# Patient Record
Sex: FEMALE | Race: ASIAN | NOT HISPANIC OR LATINO | URBAN - METROPOLITAN AREA
[De-identification: names, ages, dates, MRNs, and addresses within clinical notes are randomized per-mention and may not be internally consistent; named-entity substitution may affect disease eponyms.]

---

## 2018-08-27 ENCOUNTER — INPATIENT (INPATIENT)
Facility: HOSPITAL | Age: 53
LOS: 1 days | Discharge: HOME | DRG: 287 | End: 2018-08-29
Attending: INTERNAL MEDICINE | Admitting: HOSPITALIST
Payer: SELF-PAY

## 2018-08-27 VITALS
OXYGEN SATURATION: 99 % | HEIGHT: 59 IN | DIASTOLIC BLOOD PRESSURE: 73 MMHG | HEART RATE: 80 BPM | SYSTOLIC BLOOD PRESSURE: 149 MMHG | TEMPERATURE: 98 F | RESPIRATION RATE: 18 BRPM | WEIGHT: 104.94 LBS

## 2018-08-27 PROCEDURE — 99285 EMERGENCY DEPT VISIT HI MDM: CPT | Mod: 25

## 2018-08-27 PROCEDURE — 99053 MED SERV 10PM-8AM 24 HR FAC: CPT

## 2018-08-27 PROCEDURE — 93010 ELECTROCARDIOGRAM REPORT: CPT

## 2018-08-28 ENCOUNTER — TRANSCRIPTION ENCOUNTER (OUTPATIENT)
Age: 53
End: 2018-08-28

## 2018-08-28 DIAGNOSIS — F17.200 NICOTINE DEPENDENCE, UNSPECIFIED, UNCOMPLICATED: ICD-10-CM

## 2018-08-28 DIAGNOSIS — E03.9 HYPOTHYROIDISM, UNSPECIFIED: ICD-10-CM

## 2018-08-28 DIAGNOSIS — I24.9 ACUTE ISCHEMIC HEART DISEASE, UNSPECIFIED: ICD-10-CM

## 2018-08-28 DIAGNOSIS — Z29.9 ENCOUNTER FOR PROPHYLACTIC MEASURES, UNSPECIFIED: ICD-10-CM

## 2018-08-28 DIAGNOSIS — R07.9 CHEST PAIN, UNSPECIFIED: ICD-10-CM

## 2018-08-28 LAB
ALBUMIN SERPL ELPH-MCNC: 3.9 G/DL — SIGNIFICANT CHANGE UP (ref 3.3–5.2)
ALP SERPL-CCNC: 38 U/L — LOW (ref 40–120)
ALT FLD-CCNC: 12 U/L — SIGNIFICANT CHANGE UP
AMPHET UR-MCNC: NEGATIVE — SIGNIFICANT CHANGE UP
ANION GAP SERPL CALC-SCNC: 11 MMOL/L — SIGNIFICANT CHANGE UP (ref 5–17)
ANION GAP SERPL CALC-SCNC: 11 MMOL/L — SIGNIFICANT CHANGE UP (ref 5–17)
APPEARANCE UR: CLEAR — SIGNIFICANT CHANGE UP
APTT BLD: 34.7 SEC — SIGNIFICANT CHANGE UP (ref 27.5–37.4)
AST SERPL-CCNC: 14 U/L — SIGNIFICANT CHANGE UP
BARBITURATES UR SCN-MCNC: NEGATIVE — SIGNIFICANT CHANGE UP
BASOPHILS # BLD AUTO: 0 K/UL — SIGNIFICANT CHANGE UP (ref 0–0.2)
BASOPHILS NFR BLD AUTO: 0.6 % — SIGNIFICANT CHANGE UP (ref 0–2)
BENZODIAZ UR-MCNC: NEGATIVE — SIGNIFICANT CHANGE UP
BILIRUB SERPL-MCNC: 0.2 MG/DL — LOW (ref 0.4–2)
BILIRUB UR-MCNC: NEGATIVE — SIGNIFICANT CHANGE UP
BUN SERPL-MCNC: 20 MG/DL — SIGNIFICANT CHANGE UP (ref 8–20)
BUN SERPL-MCNC: 26 MG/DL — HIGH (ref 8–20)
CALCIUM SERPL-MCNC: 8.9 MG/DL — SIGNIFICANT CHANGE UP (ref 8.6–10.2)
CALCIUM SERPL-MCNC: 9 MG/DL — SIGNIFICANT CHANGE UP (ref 8.6–10.2)
CHLORIDE SERPL-SCNC: 103 MMOL/L — SIGNIFICANT CHANGE UP (ref 98–107)
CHLORIDE SERPL-SCNC: 106 MMOL/L — SIGNIFICANT CHANGE UP (ref 98–107)
CHOLEST SERPL-MCNC: 203 MG/DL — HIGH (ref 110–199)
CK SERPL-CCNC: 92 U/L — SIGNIFICANT CHANGE UP (ref 25–170)
CO2 SERPL-SCNC: 24 MMOL/L — SIGNIFICANT CHANGE UP (ref 22–29)
CO2 SERPL-SCNC: 26 MMOL/L — SIGNIFICANT CHANGE UP (ref 22–29)
COCAINE METAB.OTHER UR-MCNC: NEGATIVE — SIGNIFICANT CHANGE UP
COLOR SPEC: YELLOW — SIGNIFICANT CHANGE UP
CREAT SERPL-MCNC: 0.84 MG/DL — SIGNIFICANT CHANGE UP (ref 0.5–1.3)
CREAT SERPL-MCNC: 0.94 MG/DL — SIGNIFICANT CHANGE UP (ref 0.5–1.3)
D DIMER BLD IA.RAPID-MCNC: <150 NG/ML DDU — SIGNIFICANT CHANGE UP
DIFF PNL FLD: NEGATIVE — SIGNIFICANT CHANGE UP
EOSINOPHIL # BLD AUTO: 0.2 K/UL — SIGNIFICANT CHANGE UP (ref 0–0.5)
EOSINOPHIL NFR BLD AUTO: 2.3 % — SIGNIFICANT CHANGE UP (ref 0–6)
EPI CELLS # UR: SIGNIFICANT CHANGE UP
GLUCOSE SERPL-MCNC: 102 MG/DL — SIGNIFICANT CHANGE UP (ref 70–115)
GLUCOSE SERPL-MCNC: 119 MG/DL — HIGH (ref 70–115)
GLUCOSE UR QL: NEGATIVE MG/DL — SIGNIFICANT CHANGE UP
HBA1C BLD-MCNC: 5.5 % — SIGNIFICANT CHANGE UP (ref 4–5.6)
HCT VFR BLD CALC: 45.4 % — SIGNIFICANT CHANGE UP (ref 37–47)
HCT VFR BLD CALC: 45.6 % — SIGNIFICANT CHANGE UP (ref 37–47)
HDLC SERPL-MCNC: 67 MG/DL — SIGNIFICANT CHANGE UP
HGB BLD-MCNC: 15 G/DL — SIGNIFICANT CHANGE UP (ref 12–16)
HGB BLD-MCNC: 15.2 G/DL — SIGNIFICANT CHANGE UP (ref 12–16)
INR BLD: 0.86 RATIO — LOW (ref 0.88–1.16)
KETONES UR-MCNC: ABNORMAL
LEUKOCYTE ESTERASE UR-ACNC: ABNORMAL
LIPID PNL WITH DIRECT LDL SERPL: 124 MG/DL — SIGNIFICANT CHANGE UP
LYMPHOCYTES # BLD AUTO: 2.3 K/UL — SIGNIFICANT CHANGE UP (ref 1–4.8)
LYMPHOCYTES # BLD AUTO: 35.7 % — SIGNIFICANT CHANGE UP (ref 20–55)
MAGNESIUM SERPL-MCNC: 2.2 MG/DL — SIGNIFICANT CHANGE UP (ref 1.6–2.6)
MCHC RBC-ENTMCNC: 30.5 PG — SIGNIFICANT CHANGE UP (ref 27–31)
MCHC RBC-ENTMCNC: 31.1 PG — HIGH (ref 27–31)
MCHC RBC-ENTMCNC: 32.9 G/DL — SIGNIFICANT CHANGE UP (ref 32–36)
MCHC RBC-ENTMCNC: 33.5 G/DL — SIGNIFICANT CHANGE UP (ref 32–36)
MCV RBC AUTO: 92.7 FL — SIGNIFICANT CHANGE UP (ref 81–99)
MCV RBC AUTO: 92.8 FL — SIGNIFICANT CHANGE UP (ref 81–99)
METHADONE UR-MCNC: NEGATIVE — SIGNIFICANT CHANGE UP
MONOCYTES # BLD AUTO: 0.5 K/UL — SIGNIFICANT CHANGE UP (ref 0–0.8)
MONOCYTES NFR BLD AUTO: 7.7 % — SIGNIFICANT CHANGE UP (ref 3–10)
NEUTROPHILS # BLD AUTO: 3.5 K/UL — SIGNIFICANT CHANGE UP (ref 1.8–8)
NEUTROPHILS NFR BLD AUTO: 53.4 % — SIGNIFICANT CHANGE UP (ref 37–73)
NITRITE UR-MCNC: NEGATIVE — SIGNIFICANT CHANGE UP
OPIATES UR-MCNC: NEGATIVE — SIGNIFICANT CHANGE UP
PCP SPEC-MCNC: SIGNIFICANT CHANGE UP
PCP UR-MCNC: NEGATIVE — SIGNIFICANT CHANGE UP
PH UR: 6 — SIGNIFICANT CHANGE UP (ref 5–8)
PHOSPHATE SERPL-MCNC: 4.1 MG/DL — SIGNIFICANT CHANGE UP (ref 2.4–4.7)
PLATELET # BLD AUTO: 167 K/UL — SIGNIFICANT CHANGE UP (ref 150–400)
PLATELET # BLD AUTO: 170 K/UL — SIGNIFICANT CHANGE UP (ref 150–400)
POTASSIUM SERPL-MCNC: 4.2 MMOL/L — SIGNIFICANT CHANGE UP (ref 3.5–5.3)
POTASSIUM SERPL-MCNC: 4.4 MMOL/L — SIGNIFICANT CHANGE UP (ref 3.5–5.3)
POTASSIUM SERPL-SCNC: 4.2 MMOL/L — SIGNIFICANT CHANGE UP (ref 3.5–5.3)
POTASSIUM SERPL-SCNC: 4.4 MMOL/L — SIGNIFICANT CHANGE UP (ref 3.5–5.3)
PROT SERPL-MCNC: 6.7 G/DL — SIGNIFICANT CHANGE UP (ref 6.6–8.7)
PROT UR-MCNC: 15 MG/DL
PROTHROM AB SERPL-ACNC: 9.4 SEC — LOW (ref 9.8–12.7)
RBC # BLD: 4.89 M/UL — SIGNIFICANT CHANGE UP (ref 4.4–5.2)
RBC # BLD: 4.92 M/UL — SIGNIFICANT CHANGE UP (ref 4.4–5.2)
RBC # FLD: 12.4 % — SIGNIFICANT CHANGE UP (ref 11–15.6)
RBC # FLD: 12.5 % — SIGNIFICANT CHANGE UP (ref 11–15.6)
RBC CASTS # UR COMP ASSIST: NEGATIVE /HPF — SIGNIFICANT CHANGE UP (ref 0–4)
SODIUM SERPL-SCNC: 140 MMOL/L — SIGNIFICANT CHANGE UP (ref 135–145)
SODIUM SERPL-SCNC: 141 MMOL/L — SIGNIFICANT CHANGE UP (ref 135–145)
SP GR SPEC: 1.02 — SIGNIFICANT CHANGE UP (ref 1.01–1.02)
T3 SERPL-MCNC: 129 NG/DL — SIGNIFICANT CHANGE UP (ref 80–200)
T4 AB SER-ACNC: 9.5 UG/DL — SIGNIFICANT CHANGE UP (ref 4.5–12)
THC UR QL: NEGATIVE — SIGNIFICANT CHANGE UP
TOTAL CHOLESTEROL/HDL RATIO MEASUREMENT: 3 RATIO — LOW (ref 3.3–7.1)
TRIGL SERPL-MCNC: 58 MG/DL — SIGNIFICANT CHANGE UP (ref 10–200)
TROPONIN T SERPL-MCNC: <0.01 NG/ML — SIGNIFICANT CHANGE UP (ref 0–0.06)
TSH SERPL-MCNC: <0.1 UIU/ML — LOW (ref 0.27–4.2)
UROBILINOGEN FLD QL: 1 MG/DL
WBC # BLD: 6.5 K/UL — SIGNIFICANT CHANGE UP (ref 4.8–10.8)
WBC # BLD: 6.7 K/UL — SIGNIFICANT CHANGE UP (ref 4.8–10.8)
WBC # FLD AUTO: 6.5 K/UL — SIGNIFICANT CHANGE UP (ref 4.8–10.8)
WBC # FLD AUTO: 6.7 K/UL — SIGNIFICANT CHANGE UP (ref 4.8–10.8)
WBC UR QL: SIGNIFICANT CHANGE UP

## 2018-08-28 PROCEDURE — 99223 1ST HOSP IP/OBS HIGH 75: CPT

## 2018-08-28 PROCEDURE — 99497 ADVNCD CARE PLAN 30 MIN: CPT | Mod: 25

## 2018-08-28 PROCEDURE — 93351 STRESS TTE COMPLETE: CPT | Mod: 26

## 2018-08-28 PROCEDURE — 93010 ELECTROCARDIOGRAM REPORT: CPT

## 2018-08-28 PROCEDURE — 71275 CT ANGIOGRAPHY CHEST: CPT | Mod: 26

## 2018-08-28 PROCEDURE — 99255 IP/OBS CONSLTJ NEW/EST HI 80: CPT

## 2018-08-28 PROCEDURE — 12345: CPT | Mod: NC

## 2018-08-28 PROCEDURE — 71045 X-RAY EXAM CHEST 1 VIEW: CPT | Mod: 26

## 2018-08-28 RX ORDER — LOSARTAN POTASSIUM 100 MG/1
1 TABLET, FILM COATED ORAL
Qty: 30 | Refills: 0 | OUTPATIENT
Start: 2018-08-28 | End: 2018-09-26

## 2018-08-28 RX ORDER — ONDANSETRON 8 MG/1
4 TABLET, FILM COATED ORAL ONCE
Qty: 0 | Refills: 0 | Status: COMPLETED | OUTPATIENT
Start: 2018-08-28 | End: 2018-08-28

## 2018-08-28 RX ORDER — ATORVASTATIN CALCIUM 80 MG/1
20 TABLET, FILM COATED ORAL AT BEDTIME
Qty: 0 | Refills: 0 | Status: DISCONTINUED | OUTPATIENT
Start: 2018-08-28 | End: 2018-08-29

## 2018-08-28 RX ORDER — ATORVASTATIN CALCIUM 80 MG/1
1 TABLET, FILM COATED ORAL
Qty: 30 | Refills: 0 | OUTPATIENT
Start: 2018-08-28 | End: 2018-09-26

## 2018-08-28 RX ORDER — OXYCODONE AND ACETAMINOPHEN 5; 325 MG/1; MG/1
1 TABLET ORAL EVERY 6 HOURS
Qty: 0 | Refills: 0 | Status: DISCONTINUED | OUTPATIENT
Start: 2018-08-28 | End: 2018-08-29

## 2018-08-28 RX ORDER — ASPIRIN/CALCIUM CARB/MAGNESIUM 324 MG
325 TABLET ORAL ONCE
Qty: 0 | Refills: 0 | Status: COMPLETED | OUTPATIENT
Start: 2018-08-28 | End: 2018-08-28

## 2018-08-28 RX ORDER — LEVOTHYROXINE SODIUM 125 MCG
75 TABLET ORAL DAILY
Qty: 0 | Refills: 0 | Status: DISCONTINUED | OUTPATIENT
Start: 2018-08-28 | End: 2018-08-29

## 2018-08-28 RX ORDER — NITROGLYCERIN 6.5 MG
0.4 CAPSULE, EXTENDED RELEASE ORAL ONCE
Qty: 0 | Refills: 0 | Status: COMPLETED | OUTPATIENT
Start: 2018-08-28 | End: 2018-08-28

## 2018-08-28 RX ORDER — HEPARIN SODIUM 5000 [USP'U]/ML
5000 INJECTION INTRAVENOUS; SUBCUTANEOUS EVERY 8 HOURS
Qty: 0 | Refills: 0 | Status: DISCONTINUED | OUTPATIENT
Start: 2018-08-28 | End: 2018-08-29

## 2018-08-28 RX ORDER — KETOROLAC TROMETHAMINE 30 MG/ML
15 SYRINGE (ML) INJECTION ONCE
Qty: 0 | Refills: 0 | Status: DISCONTINUED | OUTPATIENT
Start: 2018-08-28 | End: 2018-08-28

## 2018-08-28 RX ORDER — LOSARTAN POTASSIUM 100 MG/1
25 TABLET, FILM COATED ORAL DAILY
Qty: 0 | Refills: 0 | Status: DISCONTINUED | OUTPATIENT
Start: 2018-08-28 | End: 2018-08-29

## 2018-08-28 RX ORDER — ONDANSETRON 8 MG/1
4 TABLET, FILM COATED ORAL EVERY 6 HOURS
Qty: 0 | Refills: 0 | Status: DISCONTINUED | OUTPATIENT
Start: 2018-08-28 | End: 2018-08-29

## 2018-08-28 RX ORDER — SODIUM CHLORIDE 9 MG/ML
1000 INJECTION INTRAMUSCULAR; INTRAVENOUS; SUBCUTANEOUS ONCE
Qty: 0 | Refills: 0 | Status: DISCONTINUED | OUTPATIENT
Start: 2018-08-28 | End: 2018-08-28

## 2018-08-28 RX ORDER — NITROGLYCERIN 6.5 MG
0.4 CAPSULE, EXTENDED RELEASE ORAL
Qty: 0 | Refills: 0 | Status: DISCONTINUED | OUTPATIENT
Start: 2018-08-28 | End: 2018-08-29

## 2018-08-28 RX ORDER — LISINOPRIL 2.5 MG/1
5 TABLET ORAL DAILY
Qty: 0 | Refills: 0 | Status: DISCONTINUED | OUTPATIENT
Start: 2018-08-28 | End: 2018-08-28

## 2018-08-28 RX ORDER — METOPROLOL TARTRATE 50 MG
12.5 TABLET ORAL EVERY 12 HOURS
Qty: 0 | Refills: 0 | Status: DISCONTINUED | OUTPATIENT
Start: 2018-08-28 | End: 2018-08-28

## 2018-08-28 RX ADMIN — HEPARIN SODIUM 5000 UNIT(S): 5000 INJECTION INTRAVENOUS; SUBCUTANEOUS at 10:55

## 2018-08-28 RX ADMIN — Medication 325 MILLIGRAM(S): at 03:18

## 2018-08-28 RX ADMIN — HEPARIN SODIUM 5000 UNIT(S): 5000 INJECTION INTRAVENOUS; SUBCUTANEOUS at 22:31

## 2018-08-28 RX ADMIN — ATORVASTATIN CALCIUM 20 MILLIGRAM(S): 80 TABLET, FILM COATED ORAL at 22:30

## 2018-08-28 NOTE — DISCHARGE NOTE ADULT - PATIENT PORTAL LINK FT
You can access the [facility] Patient Portal, offered by [facility], by registering with the following website: [email or web address]

## 2018-08-28 NOTE — ED PROVIDER NOTE - ATTENDING CONTRIBUTION TO CARE
I personally saw the patient with the PA, and completed the key components of the history and physical exam. I then discussed the management plan with the PA.   gen in nad resp clear cardiac no murmur abd soft neuro intact ischemic ekg will admit for acs

## 2018-08-28 NOTE — H&P ADULT - ASSESSMENT
52 yo female who is a current everyday smoker for past 25 years who presents with atypical chest pain concerning for ACS
other/has foot drop and walking instability

## 2018-08-28 NOTE — PROGRESS NOTE ADULT - SUBJECTIVE AND OBJECTIVE BOX
HEALTH ISSUES - PROBLEM Dx:  54 yo F with a pmh/o hypothyroidism, who is a current half pack per day smoker for over 25 yrs, who presents to ED complaining of left hand pain which began acutely today and which she thought was tendonitis. Pt denies any chest pain now, however partner and pt states she has intermittent chest pain in past and also intermittent sob. Pt also has been having epigastric pain and nausea intermittently. Today pt came to ED for L arm tingling and "heaviness." sent from urgent care due to abnormal EKG. Pt states she has had these sx in her extremities before, sometimes so severe she cannot turn her head and cannot seem to catch her breath. Pt has not been evaluated by cardiology ever and rarely sees PMD as per partner at bedside. Pt now c/o nausea at bedside. Denies diaphoresis, V/D, headache, syncope, numbness, weakness, and any other acute symptoms at this time. Unknown family history.    INTERVAL HPI/ OVERNIGHT EVENTS:  repeated c/o CP. with spontaneous resolution.  when asked she says she has CP. and later she doesnt talk about it.      Vital Signs Last 24 Hrs  T(C): 36.7 (28 Aug 2018 10:50), Max: 36.8 (27 Aug 2018 23:03)  T(F): 98.1 (28 Aug 2018 10:50), Max: 98.3 (27 Aug 2018 23:03)  HR: 70 (28 Aug 2018 10:50) (70 - 83)  BP: 111/52 (28 Aug 2018 10:50) (110/63 - 149/73)  BP(mean): --  RR: 18 (28 Aug 2018 10:50) (16 - 18)  SpO2: 97% (28 Aug 2018 10:50) (97% - 99%)    PHYSICAL EXAM-  GENERAL: NAD, well-groomed, well-developed  HEAD:  Atraumatic, Normocephalic  EYES: conjunctiva and sclera clear  ENMT: Moist mucous membranes, No lesions  NECK: Supple, No JVD, Normal thyroid  NERVOUS SYSTEM:  Alert & Oriented X 3, Motor Strength 5/5 B/L upper and lower extremities  CHEST/LUNG: Clear to auscultate bilaterally; No rales, rhonchi, wheezing, or rubs  HEART: Regular rate and rhythm; No murmurs, rubs, or gallops  ABDOMEN: Soft, Nontender, Nondistended; Bowel sounds present  EXTREMITIES:  2+ Peripheral Pulses, No clubbing, cyanosis, or edema  LYMPH: No lymphadenopathy noted  SKIN: No rashes or lesions    LABS:                        15.0   6.7   )-----------( 170      ( 28 Aug 2018 06:32 )             45.6         141  |  106  |  26.0<H>  ----------------------------<  102  4.4   |  24.0  |  0.84    Ca    8.9      28 Aug 2018 06:32  Phos  4.1       Mg     2.2         TPro  6.7  /  Alb  3.9  /  TBili  0.2<L>  /  DBili  x   /  AST  14  /  ALT  12  /  AlkPhos  38<L>      PT/INR - ( 28 Aug 2018 04:52 )   PT: 9.4 sec;   INR: 0.86 ratio         PTT - ( 28 Aug 2018 04:52 )  PTT:34.7 sec  Urinalysis Basic - ( 28 Aug 2018 06:14 )    Color: Yellow / Appearance: Clear / S.025 / pH: x  Gluc: x / Ketone: Trace  / Bili: Negative / Urobili: 1 mg/dL   Blood: x / Protein: 15 mg/dL / Nitrite: Negative   Leuk Esterase: Trace / RBC: Negative /HPF / WBC 0-2   Sq Epi: x / Non Sq Epi: Few / Bacteria: x      Assessment and Plan

## 2018-08-28 NOTE — PROGRESS NOTE ADULT - PROBLEM SELECTOR PLAN 1
admit to telemetry  EKG reviewed-lateral and inferior lead T wave inversion noted  EKG prn chest pain  NTG prn chest pain  ASA held as pt reports allergy   BB, ACE, statin started at low dose  serial troponin, first negative  TTE  HbA1c  lipid panel  cardiology consulted- [facility] cardiology

## 2018-08-28 NOTE — DISCHARGE NOTE ADULT - MEDICATION SUMMARY - MEDICATIONS TO TAKE
I will START or STAY ON the medications listed below when I get home from the hospital:    hydroxycut  -- at same dose as before as needed  -- Indication: For Home med    losartan 25 mg oral tablet  -- 1 tab(s) by mouth once a day  -- Indication: For Htn    atorvastatin 20 mg oral tablet  -- 1 tab(s) by mouth once a day (at bedtime)  -- Indication: For Hld    levothyroxine 75 mcg (0.075 mg) oral tablet  -- 1 tab(s) by mouth once a day  -- Indication: For Hypothyroidism I will START or STAY ON the medications listed below when I get home from the hospital:    losartan 50 mg oral tablet  -- 1 tab(s) by mouth once a day  -- Indication: For HTN    atorvastatin 20 mg oral tablet  -- 1 tab(s) by mouth once a day (at bedtime)  -- Indication: For HLD    levothyroxine 75 mcg (0.075 mg) oral tablet  -- 1 tab(s) by mouth once a day  -- Indication: For Hypothyroidism

## 2018-08-28 NOTE — ED PROVIDER NOTE - OBJECTIVE STATEMENT
52 yo F Pt, PmHx: hypothyroidism, presents to ED complaining of chest pain. Pt states she has intermittent chest pain, worse with deep inspiration starting @08:00. PT admits to associated L arm tingling and "heaviness." Pt states she has had similar symptoms in the past but has not received a work up. Pt seen at  and sent to ED for abnormal EKG. Pt denies SOB, diaphoresis, N/V/D, headache, syncope, numbness, weakness, and any other acute symptoms at this time. Unknown family history.

## 2018-08-28 NOTE — H&P ADULT - HISTORY OF PRESENT ILLNESS
52 yo F with a pmh/o hypothyroidism, who is a current half pack per day smoker for over 25 yrs, who presents to ED complaining of left hand pain which began acutely today and which she thought was tendonitis. Pt denies any chest pain now, however partner and pt states she has intermittent chest pain in past and also intermittent sob. Pt also has been having epigastric pain and nausea intermittently. Today pt came to ED for L arm tingling and "heaviness." sent from urgent care due to abnormal EKG. Pt states she has had these sx in her extremeties before, sometimes so severe she cannot turn her head and cannot seem to catch her breath. Pt has not been evaluated by cardiology ever and rarely sees pmd as per partner at bedside. Pt now c/o nausea at bedside. Denies diaphoresis, V/D, headache, syncope, numbness, weakness, and any other acute symptoms at this time. Unknown family history.

## 2018-08-28 NOTE — CONSULT NOTE ADULT - ASSESSMENT
Assessment / Plan:  54 yo F Pt, PmHx: hypothyroidism, presents to ED complaining of chest pain. Pt states she has intermittent chest pain, worse with deep inspiration starting @08:00. PT admits to associated L arm tingling and "heaviness." Pt states she has had similar symptoms in the past but has not received a work up. Pt seen at  and sent to ED for abnormal EKG.    1) Atypical chest pain with abnormal EKG:  r/o ischemic cause                                                                 serial CE and EKG's, TTE                                                                  given CP increases with inspiration, smoking history and wheezing consider CT angio of the chest vs cardiac CT angio    2) Hypothyroid: low TSH would hold synthroid for now conside thyroid ultra sound

## 2018-08-28 NOTE — H&P ADULT - PROBLEM SELECTOR PLAN 1
admit to telemetry  EKG reviewed-lateral and inferior lead T wave inversion noted  EKG prn chest pain  NTG prn chest pain  ASA held as pt reports allergy   BB, ACE, statin started at low dose  serial troponin, first negative  TTE  HbA1c  lipid panel  cardiology consulted- [facility] cardiology

## 2018-08-28 NOTE — DISCHARGE NOTE ADULT - PLAN OF CARE
cardiac w/u negative Follow with PMD/ cardiology Follow with PMD/ cardiology Dr Garcia in 7-10 days  Restricted use with no heavy lifting of affected arm for 48 hours.  No submerging the arm in water for 48 hours.  You may start showering today.  Call your doctor for any bleeding, swelling, loss of sensation in the hand or fingers, or fingers turning blue.  If heavy bleeding or large lumps form, hold pressure at the spot and come to the Emergency Room.

## 2018-08-28 NOTE — ED ADULT NURSE NOTE - NSIMPLEMENTINTERV_GEN_ALL_ED
Implemented All Universal Safety Interventions:  [place] to call system. Call bell, personal items and telephone within reach. Instruct patient to call for assistance. Room bathroom lighting operational. Non-slip footwear when patient is off stretcher. Physically safe environment: no spills, clutter or unnecessary equipment. Stretcher in lowest position, wheels locked, appropriate side rails in place.

## 2018-08-28 NOTE — DISCHARGE NOTE ADULT - CARE PROVIDER_API CALL
Med Garcia), Cardiovascular Disease  [address] [place] [address]  Phone: [phone]  Fax: [phone]    JOHNNY staton  Phone: (   )    -  Fax: (   )    -

## 2018-08-28 NOTE — ED PROVIDER NOTE - CHPI ED SYMPTOMS NEG
no dizziness/no fever/no back pain/no diaphoresis/no chills/no cough/no shortness of breath/no vomiting/no nausea/no syncope

## 2018-08-28 NOTE — ED ADULT NURSE REASSESSMENT NOTE - NS ED NURSE REASSESS COMMENT FT1
Pt is Aox3 in NAD. Pt denies complaint.  IV clean dry and intact,Sinus carmelita on monitor 60s  Safety maintained, Bed locked in lowest position. Call bell in reach. Pt awaiting bed placement.

## 2018-08-28 NOTE — ED ADULT NURSE NOTE - OBJECTIVE STATEMENT
Pt received sitting on stretcher in NAD playing a game on phone. Pt AOx3 C/o of chest pain x few weeks but went to urgentcare and was told to come in to ED. Pt states the paint goes from EPI gastric to middle of chest. Neuro WNL. PERRLA. Lungs CTA, RR even unlabored. Ab soft non tender, + bowel sounds x 4quads. Denies Nausea, Vomiting, Diarrhea. Skin warm, dry, color appropriate for age and race. pt on monitor sinus 70s

## 2018-08-28 NOTE — DISCHARGE NOTE ADULT - NS AS ACTIVITY OBS
Walking-Outdoors allowed/Stairs allowed/Walking-Indoors allowed/Bathing allowed/No Heavy lifting/straining/Do not make important decisions/Sex allowed/Showering allowed

## 2018-08-28 NOTE — H&P ADULT - ATTENDING COMMENTS
30 min time spent discussing advanced care planning including code status, existence of health care proxy, plan of treatment, consultants if called, and prognosis. Family and pt in agreement with above, all questions answered and concerns addressed.      patient agrees to npo and bed rest until further evaluated by cardiology  FULL CODE

## 2018-08-28 NOTE — DISCHARGE NOTE ADULT - MEDICATION SUMMARY - MEDICATIONS TO STOP TAKING
I will STOP taking the medications listed below when I get home from the hospital:  None I will STOP taking the medications listed below when I get home from the hospital:    hydroxybhavyat

## 2018-08-28 NOTE — DISCHARGE NOTE ADULT - CARE PLAN
Principal Discharge DX:	Chest pain  Goal:	cardiac w/u negative  Assessment and plan of treatment:	Follow with PMD/ cardiology  Secondary Diagnosis:	Acquired hypothyroidism  Secondary Diagnosis:	Smoker Principal Discharge DX:	Chest pain  Goal:	cardiac w/u negative  Assessment and plan of treatment:	Follow with PMD/ cardiology Dr Garcia in 7-10 days  Restricted use with no heavy lifting of affected arm for 48 hours.  No submerging the arm in water for 48 hours.  You may start showering today.  Call your doctor for any bleeding, swelling, loss of sensation in the hand or fingers, or fingers turning blue.  If heavy bleeding or large lumps form, hold pressure at the spot and come to the Emergency Room.  Secondary Diagnosis:	Acquired hypothyroidism  Secondary Diagnosis:	Smoker

## 2018-08-28 NOTE — DISCHARGE NOTE ADULT - HOSPITAL COURSE
admitted with CP. noted borderline HTN. now controlled. acs ruled out. CTA ruled out dissection/ PE. NST    Medically stable and agreeable with discharge and follow up plan. Patient was advised to return to ED if any symptoms occur or worsen.    time 45 mins Admitted with CP. Noted borderline HTN. Now controlled. acs ruled out. CTA ruled out dissection/ PE. NST infr wall changes. So taken for cath- Non obstructive CAD. ECHO with mod to severe AI. ARB for after load reduction started.    Medically stable and agreeable with discharge and follow up plan. Patient was advised to return to ED if any symptoms occur or worsen.    time 45 mins

## 2018-08-28 NOTE — CONSULT NOTE ADULT - ATTENDING COMMENTS
pt was seen and examined. above reviewed.  Atypical CP on and off for about a year.   EKG with LVH and ST/T changes.   CE x3 negative.  Pt had a TTE which I reviewed. The aortic kriss is trileaflet, there is at least moderate aortic valve regurgitation.    Due to CP and above finding, CTA of aorta was performed, there was no dissection, no PE. No coronary calcium.   She will undergo stress test today. If negative, she can go home.  She will need afterload reduction for HTN and AI.  If discharged, she will follow with me.  She is advised not to smoke as well.

## 2018-08-28 NOTE — PROGRESS NOTE ADULT - ASSESSMENT
54 yo female who is a current everyday smoker for past 25 years who presents with atypical chest pain concerning for ACS     1) CP- trops neg. tele uneventful. echo showed some concern of aortic dissection. CTA done- neg for dissection. going for NST. If neg, d/c home  repeated c/o CP could be musculoskeletal     2) Smoker - Nicotine Patch.  I strongly encouraged the patient to quit smoking.  I outlined the extensive negative impact of smoking on quality of life and the numerous consequences of continued smoking including lung disease, heart attack, stroke, vascular disease, malignancy, increased mortality, etc.  The patient understood these effects and agreed on smoking cessation.    3) Hypothyroid- Cont home meds    Lovenox

## 2018-08-28 NOTE — CONSULT NOTE ADULT - SUBJECTIVE AND OBJECTIVE BOX
Consult requested by:  ANITA Chen    Reason for Consultation: Chest pain abnormal EKG    History obtained by: Patient and medical record     obtained: No    Chief complaint:  Chest pain    HPI: 52 yo F Pt, PmHx: hypothyroidism, presents to ED complaining of chest pain. Pt states she has intermittent chest pain, worse with deep inspiration starting @08:00. PT admits to associated L arm tingling and "heaviness." Pt states she has had similar symptoms in the past but has not received a work up. Pt seen at  and sent to ED for abnormal EKG. Pt denies SOB, diaphoresis, N/V/D, headache, syncope, numbness, weakness, and any other acute symptoms at this time. Unknown family history.  Above reviewed and noted: I saw and examined this patient in the ED with her  an the bed side, she is in NAD. She admits to midsternal sharp to ache 8/10 chest pain that began one day ago and increases with deep breathing, she note nausea with the chest pain is strong. She also admits to left arm numbness. She denies vomiting, lightheadedness, dizziness, weakness, leg pain, FELIZ, recent illness, cough, recent travel.        REVIEW OF SYSTEMS:  CONSTITUTIONAL: No fever, weight loss, or fatigue  EYES: No eye pain, visual disturbances, or discharge  ENMT:  No difficulty hearing, tinnitus, vertigo; No sinus or throat pain  NECK: No pain or stiffness  RESPIRATORY: No cough, wheezing, chills or hemoptysis; No shortness of breath  CARDIOVASCULAR: + chest pain, no palpitations, dizziness, or leg swelling  GASTROINTESTINAL: No abdominal or epigastric pain. + nausea, No vomiting, or hematemesis; No diarrhea or constipation. No melena or hematochezia.  GENITOURINARY: No dysuria, frequency, hematuria, or incontinence  NEUROLOGICAL: No headaches, memory loss, loss of strength, numbness, or tremors  SKIN: No itching, burning, rashes, or lesions   LYMPH NODES: No enlarged glands  ENDOCRINE: No heat or cold intolerance; No hair loss  MUSCULOSKELETAL: No joint pain or swelling; No muscle, back, or extremity pain  PSYCHIATRIC: No depression, anxiety, mood swings, or difficulty sleeping  HEME/LYMPH: No easy bruising, or bleeding gums  ALLERY AND IMMUNOLOGIC: No hives or eczema      MEDICATIONS  (STANDING):  atorvastatin 20 milliGRAM(s) Oral at bedtime  heparin  Injectable 5000 Unit(s) SubCutaneous every 8 hours  levothyroxine 75 MICROGram(s) Oral daily  lisinopril 5 milliGRAM(s) Oral daily  metoprolol tartrate 12.5 milliGRAM(s) Oral every 12 hours  ondansetron Injectable 4 milliGRAM(s) IV Push once    MEDICATIONS  (PRN):  nitroglycerin     SubLingual 0.4 milliGRAM(s) SubLingual every 5 minutes PRN Chest Pain  ondansetron Injectable 4 milliGRAM(s) IV Push every 6 hours PRN Nausea        PAST MEDICAL & SURGICAL HISTORY:  Hypothyroidism  No significant past surgical history      FAMILY HISTORY: unknown due to adoption     SOCIAL HISTORY:  CIGARETTES: current 1/2 PPD 25+ years  ALCOHOL: rare  DRUGS: denied     Vital Signs Last 24 Hrs  T(C): 36.7 (28 Aug 2018 03:11), Max: 36.8 (27 Aug 2018 23:03)  T(F): 98 (28 Aug 2018 03:11), Max: 98.3 (27 Aug 2018 23:03)  HR: 83 (28 Aug 2018 03:11) (80 - 83)  BP: 110/63 (28 Aug 2018 03:11) (110/63 - 149/73)  BP(mean): --  RR: 16 (28 Aug 2018 03:11) (16 - 18)  SpO2: 98% (28 Aug 2018 03:11) (98% - 99%)    PHYSICAL EXAM:  GENERAL: NAD, well-groomed, well-developed  HEAD:  Atraumatic, Normocephalic  EYES: EOMI, PERRLA, conjunctiva and sclera clear  ENMT: No tonsillar erythema, exudates, or enlargement; Moist mucous membranes, Good dentition, No lesions  NECK: Supple, No JVD, Normal thyroid  NERVOUS SYSTEM:  Alert & Oriented X3, Good concentration; Motor Strength 5/5 B/L upper and lower extremities  CHEST/LUNG: scattered wheezes, no rhonchi  HEART: Regular rate and rhythm; No murmurs, rubs, or gallops  ABDOMEN: Soft, Nontender, Nondistended; Bowel sounds present  EXTREMITIES:  2+ Peripheral Pulses, No clubbing, cyanosis, or edema, LUE wrist brace in place  LYMPH: No lymphadenopathy noted  SKIN: No rashes or lesions        INTERPRETATION OF TELEMETRY: sinus rate 62    ECG: NSR rate 81 T wave inversion in leads II, III, V3 thur V6, aVF, no acute ST elevation or depression.     I&O's Detail      LABS:                        15.2   6.5   )-----------( 167      ( 28 Aug 2018 01:27 )             45.4     08-28    140  |  103  |  20.0  ----------------------------<  119<H>  4.2   |  26.0  |  0.94    Ca    9.0      28 Aug 2018 01:27    TSH; < 0.10    CARDIAC MARKERS ( 28 Aug 2018 01:27 )  x     / <0.01 ng/mL / x     / x     / x          PT/INR - ( 28 Aug 2018 04:52 )   PT: 9.4 sec;   INR: 0.86 ratio         PTT - ( 28 Aug 2018 04:52 )  PTT:34.7 sec    I&O's Summary      RADIOLOGY & ADDITIONAL STUDIES:  X-ray:  grossly normal    PREVIOUS DIAGNOSTIC TESTING:  None     ECHO: pending Consult requested by:  ANITA Chen    Reason for Consultation: Chest pain abnormal EKG    History obtained by: Patient and medical record     obtained: No    Chief complaint:  Chest pain    HPI: 52 yo F Pt, PmHx: hypothyroidism, presents to ED complaining of chest pain. Pt states she has intermittent chest pain, worse with deep inspiration starting @08:00. PT admits to associated L arm tingling and "heaviness." Pt states she has had similar symptoms in the past but has not received a work up. Pt seen at  and sent to ED for abnormal EKG. Pt denies SOB, diaphoresis, N/V/D, headache, syncope, numbness, weakness, and any other acute symptoms at this time. Unknown family history.  Above reviewed and noted: I saw and examined this patient in the ED with her  an the bed side, she is in NAD. She admits to midsternal sharp to ache 8/10 chest pain that began one day ago and increases with deep breathing, she note nausea with the chest pain is strong. She also admits to left arm numbness. She denies vomiting, lightheadedness, dizziness, weakness, leg pain, FELIZ, recent illness, cough, recent travel.        REVIEW OF SYSTEMS:  CONSTITUTIONAL: No fever, weight loss, or fatigue  EYES: No eye pain, visual disturbances, or discharge  ENMT:  No difficulty hearing, tinnitus, vertigo; No sinus or throat pain  NECK: No pain or stiffness  RESPIRATORY: No cough, wheezing, chills or hemoptysis; No shortness of breath  CARDIOVASCULAR: + chest pain, no palpitations, dizziness, or leg swelling  GASTROINTESTINAL: No abdominal or epigastric pain. + nausea, No vomiting, or hematemesis; No diarrhea or constipation. No melena or hematochezia.  GENITOURINARY: No dysuria, frequency, hematuria, or incontinence  NEUROLOGICAL: No headaches, memory loss, loss of strength, numbness, or tremors  SKIN: No itching, burning, rashes, or lesions   LYMPH NODES: No enlarged glands  ENDOCRINE: No heat or cold intolerance; No hair loss  MUSCULOSKELETAL: No joint pain or swelling; No muscle, back, or extremity pain  PSYCHIATRIC: No depression, anxiety, mood swings, or difficulty sleeping  HEME/LYMPH: No easy bruising, or bleeding gums  ALLERY AND IMMUNOLOGIC: No hives or eczema      MEDICATIONS  (STANDING):  atorvastatin 20 milliGRAM(s) Oral at bedtime  heparin  Injectable 5000 Unit(s) SubCutaneous every 8 hours  levothyroxine 75 MICROGram(s) Oral daily  lisinopril 5 milliGRAM(s) Oral daily  metoprolol tartrate 12.5 milliGRAM(s) Oral every 12 hours  ondansetron Injectable 4 milliGRAM(s) IV Push once    MEDICATIONS  (PRN):  nitroglycerin     SubLingual 0.4 milliGRAM(s) SubLingual every 5 minutes PRN Chest Pain  ondansetron Injectable 4 milliGRAM(s) IV Push every 6 hours PRN Nausea        PAST MEDICAL & SURGICAL HISTORY:  Hypothyroidism  No significant past surgical history      FAMILY HISTORY: unknown due to adoption     SOCIAL HISTORY:  CIGARETTES: current 1/2 PPD 25+ years  ALCOHOL: rare  DRUGS: denied     Vital Signs Last 24 Hrs  T(C): 36.7 (28 Aug 2018 03:11), Max: 36.8 (27 Aug 2018 23:03)  T(F): 98 (28 Aug 2018 03:11), Max: 98.3 (27 Aug 2018 23:03)  HR: 83 (28 Aug 2018 03:11) (80 - 83)  BP: 110/63 (28 Aug 2018 03:11) (110/63 - 149/73)  BP(mean): --  RR: 16 (28 Aug 2018 03:11) (16 - 18)  SpO2: 98% (28 Aug 2018 03:11) (98% - 99%)    PHYSICAL EXAM:  GENERAL: NAD, well-groomed, well-developed  HEAD:  Atraumatic, Normocephalic  EYES: EOMI, PERRLA, conjunctiva and sclera clear  ENMT: No tonsillar erythema, exudates, or enlargement; Moist mucous membranes, Good dentition, No lesions  NECK: Supple, No JVD, Normal thyroid  NERVOUS SYSTEM:  Alert & Oriented X3, Good concentration; Motor Strength 5/5 B/L upper and lower extremities  CHEST/LUNG: scattered wheezes, no rhonchi  HEART: Regular rate and rhythm; No murmurs, rubs, or gallops  ABDOMEN: Soft, Nontender, Nondistended; Bowel sounds present  EXTREMITIES:  2+ Peripheral Pulses, No clubbing, cyanosis, or edema, LUE wrist brace in place  LYMPH: No lymphadenopathy noted  SKIN: No rashes or lesions        INTERPRETATION OF TELEMETRY: sinus rate 62    ECG: NSR rate 81 T wave inversion in leads II, III, V3 thur V6, aVF, no acute ST elevation or depression.     I&O's Detail      LABS:                        15.2   6.5   )-----------( 167      ( 28 Aug 2018 01:27 )             45.4     08-28    140  |  103  |  20.0  ----------------------------<  119<H>  4.2   |  26.0  |  0.94    Ca    9.0      28 Aug 2018 01:27    TSH; < 0.10    CARDIAC MARKERS ( 28 Aug 2018 01:27 )  x     / <0.01 ng/mL / x     / x     / x        D-Dimer: < 150    PT/INR - ( 28 Aug 2018 04:52 )   PT: 9.4 sec;   INR: 0.86 ratio         PTT - ( 28 Aug 2018 04:52 )  PTT:34.7 sec    I&O's Summary      RADIOLOGY & ADDITIONAL STUDIES:  X-ray:  grossly normal    PREVIOUS DIAGNOSTIC TESTING:  None     ECHO: pending

## 2018-08-29 VITALS
OXYGEN SATURATION: 99 % | DIASTOLIC BLOOD PRESSURE: 71 MMHG | RESPIRATION RATE: 12 BRPM | HEART RATE: 76 BPM | SYSTOLIC BLOOD PRESSURE: 168 MMHG

## 2018-08-29 LAB — HCG SERPL QL: NEGATIVE — SIGNIFICANT CHANGE UP

## 2018-08-29 PROCEDURE — 85027 COMPLETE CBC AUTOMATED: CPT

## 2018-08-29 PROCEDURE — 71045 X-RAY EXAM CHEST 1 VIEW: CPT

## 2018-08-29 PROCEDURE — 80048 BASIC METABOLIC PNL TOTAL CA: CPT

## 2018-08-29 PROCEDURE — 83036 HEMOGLOBIN GLYCOSYLATED A1C: CPT

## 2018-08-29 PROCEDURE — 99152 MOD SED SAME PHYS/QHP 5/>YRS: CPT

## 2018-08-29 PROCEDURE — 93005 ELECTROCARDIOGRAM TRACING: CPT

## 2018-08-29 PROCEDURE — C1769: CPT

## 2018-08-29 PROCEDURE — 93351 STRESS TTE COMPLETE: CPT

## 2018-08-29 PROCEDURE — 85730 THROMBOPLASTIN TIME PARTIAL: CPT

## 2018-08-29 PROCEDURE — 81001 URINALYSIS AUTO W/SCOPE: CPT

## 2018-08-29 PROCEDURE — 80053 COMPREHEN METABOLIC PANEL: CPT

## 2018-08-29 PROCEDURE — C1894: CPT

## 2018-08-29 PROCEDURE — 84703 CHORIONIC GONADOTROPIN ASSAY: CPT

## 2018-08-29 PROCEDURE — 83735 ASSAY OF MAGNESIUM: CPT

## 2018-08-29 PROCEDURE — 80061 LIPID PANEL: CPT

## 2018-08-29 PROCEDURE — 99232 SBSQ HOSP IP/OBS MODERATE 35: CPT

## 2018-08-29 PROCEDURE — 84100 ASSAY OF PHOSPHORUS: CPT

## 2018-08-29 PROCEDURE — C8929: CPT

## 2018-08-29 PROCEDURE — 99285 EMERGENCY DEPT VISIT HI MDM: CPT | Mod: 25

## 2018-08-29 PROCEDURE — 93454 CORONARY ARTERY ANGIO S&I: CPT

## 2018-08-29 PROCEDURE — 84443 ASSAY THYROID STIM HORMONE: CPT

## 2018-08-29 PROCEDURE — C1887: CPT

## 2018-08-29 PROCEDURE — 84480 ASSAY TRIIODOTHYRONINE (T3): CPT

## 2018-08-29 PROCEDURE — 82550 ASSAY OF CK (CPK): CPT

## 2018-08-29 PROCEDURE — 80307 DRUG TEST PRSMV CHEM ANLYZR: CPT

## 2018-08-29 PROCEDURE — 85610 PROTHROMBIN TIME: CPT

## 2018-08-29 PROCEDURE — 85379 FIBRIN DEGRADATION QUANT: CPT

## 2018-08-29 PROCEDURE — 36415 COLL VENOUS BLD VENIPUNCTURE: CPT

## 2018-08-29 PROCEDURE — 99239 HOSP IP/OBS DSCHRG MGMT >30: CPT

## 2018-08-29 PROCEDURE — 84484 ASSAY OF TROPONIN QUANT: CPT

## 2018-08-29 PROCEDURE — 84436 ASSAY OF TOTAL THYROXINE: CPT

## 2018-08-29 PROCEDURE — 71275 CT ANGIOGRAPHY CHEST: CPT

## 2018-08-29 PROCEDURE — 99153 MOD SED SAME PHYS/QHP EA: CPT

## 2018-08-29 RX ORDER — LOSARTAN POTASSIUM 100 MG/1
1 TABLET, FILM COATED ORAL
Qty: 30 | Refills: 0 | OUTPATIENT
Start: 2018-08-29 | End: 2018-09-27

## 2018-08-29 RX ADMIN — HEPARIN SODIUM 5000 UNIT(S): 5000 INJECTION INTRAVENOUS; SUBCUTANEOUS at 05:41

## 2018-08-29 NOTE — PROGRESS NOTE ADULT - SUBJECTIVE AND OBJECTIVE BOX
HEALTH ISSUES - PROBLEM Dx:  54 yo F with a pmh/o hypothyroidism, who is a current half pack per day smoker for over 25 yrs, who presents to ED complaining of left hand pain which began acutely. NST infr wall changes so planned cath      INTERVAL HPI/ OVERNIGHT EVENTS:  echo with mod to sev AI   going for cath today    Vital Signs Last 24 Hrs  T(C): 36.8 (29 Aug 2018 07:20), Max: 37 (29 Aug 2018 06:45)  T(F): 98.3 (29 Aug 2018 07:20), Max: 98.6 (29 Aug 2018 06:45)  HR: 76 (29 Aug 2018 12:59) (57 - 79)  BP: 168/71 (29 Aug 2018 12:59) (112/66 - 168/71)  BP(mean): --  RR: 12 (29 Aug 2018 12:59) (12 - 20)  SpO2: 99% (29 Aug 2018 12:59) (97% - 100%)    PHYSICAL EXAM-  GENERAL: NAD, well-groomed, well-developed  HEAD:  Atraumatic, Normocephalic  EYES: conjunctiva and sclera clear  ENMT: Moist mucous membranes, No lesions  NECK: Supple, No JVD, Normal thyroid  NERVOUS SYSTEM:  Alert & Oriented X 3, Motor Strength 5/5 B/L upper and lower extremities  CHEST/LUNG: Clear to auscultate bilaterally; No rales, rhonchi, wheezing, or rubs  HEART: Regular rate and rhythm; No murmurs, rubs, or gallops  ABDOMEN: Soft, Nontender, Nondistended; Bowel sounds present  EXTREMITIES:  2+ Peripheral Pulses, No clubbing, cyanosis, or edema  LYMPH: No lymphadenopathy noted  SKIN: No rashes or lesions    LABS:                        15.0   6.7   )-----------( 170      ( 28 Aug 2018 06:32 )             45.6     08-28    141  |  106  |  26.0<H>  ----------------------------<  102  4.4   |  24.0  |  0.84    Ca    8.9      28 Aug 2018 06:32  Phos  4.1     08-  Mg     2.2     08-    TPro  6.7  /  Alb  3.9  /  TBili  0.2<L>  /  DBili  x   /  AST  14  /  ALT  12  /  AlkPhos  38<L>  08-    PT/INR - ( 28 Aug 2018 04:52 )   PT: 9.4 sec;   INR: 0.86 ratio       PTT - ( 28 Aug 2018 04:52 )  PTT:34.7 sec  Urinalysis Basic - ( 28 Aug 2018 06:14 )    Color: Yellow / Appearance: Clear / S.025 / pH: x  Gluc: x / Ketone: Trace  / Bili: Negative / Urobili: 1 mg/dL   Blood: x / Protein: 15 mg/dL / Nitrite: Negative   Leuk Esterase: Trace / RBC: Negative /HPF / WBC 0-2   Sq Epi: x / Non Sq Epi: Few / Bacteria: x    Assessment and Plan

## 2018-08-29 NOTE — PROGRESS NOTE ADULT - SUBJECTIVE AND OBJECTIVE BOX
S/P cath  Non obstructive CAD  Right radial site benign  Post procedure teaching done  Patient verbalizes understanding.  Will monitor for change.

## 2018-08-29 NOTE — PROGRESS NOTE ADULT - SUBJECTIVE AND OBJECTIVE BOX
Pt presents from ED for LHC. Reports moderate improvement in chest pain overnight. Otherwise denies changes in health status.   ASA 2  Mall 2    Groin prep per protocol.   All questions answered to pt & husbands expressed satisfaction. Pt presents from ED for LHC. Reports moderate improvement in chest pain overnight. Otherwise denies changes in health status.   ASA 2  Mall 2      MEDICATIONS  (STANDING):  atorvastatin 20 milliGRAM(s) Oral at bedtime  heparin  Injectable 5000 Unit(s) SubCutaneous every 8 hours  levothyroxine 75 MICROGram(s) Oral daily  losartan 25 milliGRAM(s) Oral daily    MEDICATIONS  (PRN):  nitroglycerin     SubLingual 0.4 milliGRAM(s) SubLingual every 5 minutes PRN Chest Pain  ondansetron Injectable 4 milliGRAM(s) IV Push every 6 hours PRN Nausea  oxyCODONE    5 mG/acetaminophen 325 mG 1 Tablet(s) Oral every 6 hours PRN Moderate Pain (4 - 6)      Allergies  aspirin (Unknown)  codeine (Unknown)       Vital Signs Last 24 Hrs  T(C): 36.8 (29 Aug 2018 07:20), Max: 37 (29 Aug 2018 06:45)  T(F): 98.3 (29 Aug 2018 07:20), Max: 98.6 (29 Aug 2018 06:45)  HR: 59 (29 Aug 2018 07:20) (57 - 72)  BP: 129/67 (29 Aug 2018 07:20) (111/52 - 152/61)  RR: 12 (29 Aug 2018 07:20) (12 - 20)  SpO2: 100% (29 Aug 2018 07:20) (97% - 100%)    Physical Exam:  Constitutional: NAD, AAOx3  Cardiovascular: +S1S2 RRR  Pulmonary: CTA b/l, unlabored  GI: soft NTND +BS  Extremities: no pedal edema, +distal pulses b/l  Neuro: non focal, VANESSA x4    LABS:                        15.0   6.7   )-----------( 170      ( 28 Aug 2018 06:32 )             45.6     -    141  |  106  |  26.0<H>  ----------------------------<  102  4.4   |  24.0  |  0.84    Ca    8.9      28 Aug 2018 06:32  Phos  4.1       Mg     2.2         TPro  6.7  /  Alb  3.9  /  TBili  0.2<L>  /  DBili  x   /  AST  14  /  ALT  12  /  AlkPhos  38<L>      PT/INR - ( 28 Aug 2018 04:52 )   PT: 9.4 sec;   INR: 0.86 ratio         PTT - ( 28 Aug 2018 04:52 )  PTT:34.7 sec  Urinalysis Basic - ( 28 Aug 2018 06:14 )    Color: Yellow / Appearance: Clear / S.025 / pH: x  Gluc: x / Ketone: Trace  / Bili: Negative / Urobili: 1 mg/dL   Blood: x / Protein: 15 mg/dL / Nitrite: Negative   Leuk Esterase: Trace / RBC: Negative /HPF / WBC 0-2   Sq Epi: x / Non Sq Epi: Few / Bacteria: x        RADIOLOGY & ADDITIONAL TESTS:  < from: TTE Echo w/Cont Complete (18 @ 07:58) >  Summary:   1. Moderate to severe aortic regurgitation.   2. Left ventricular ejection fraction, by visual estimation, is 65 to   70%.   3. Normal global left ventricular systolic function.   4. Increased LV wall thickness.   5. Spectral Doppler shows impaired relaxation pattern of left   ventricular myocardial filling (Grade I diastolic dysfunction).   6. There is mild concentric left ventricular hypertrophy.   7. Trace tricuspid regurgitation.   8. Trace pulmonic valve regurgitation.    [id number] Allan Mendes MD, Electronically signed on 2018 at 2:46:09 PM      < from: Stress Echocardiogram (18 @ 14:28) >  Summary:   1. There was no reported chest pain. EKG was not interpretable due to   baseline st/t changes.   2. The patient's functional capacity was good.   3. There is wall motion abnormality involving the basal anterolateral   wall, basal anteroseptal wall. The apical segments are liz   normally. Due to chest pain, EKG abnormality and Echo findings, cardiac   catheterization is advised.    [id number] Med Garcia MD Electronically signed on 2018 at   5:40:28 PM  *** Final ***      52 yo F Pt, PmHx: hypothyroidism, presented to [facility] ED 18 complaining of chest pain. Stress test demonstrated basal anterolateral   wall, basal anteroseptal wall abnormalities. She now presents for [facility].     Plan:   Groin prep per protocol.   All questions answered to pt & husbands expressed satisfaction.   Consent w/ attending MD.

## 2018-08-29 NOTE — PROGRESS NOTE ADULT - ASSESSMENT
54 yo female who is a current everyday smoker for past 25 years who presents with atypical chest pain concerning for ACS     1) CP- trops neg. tele uneventful. echo showed some concern of aortic dissection. CTA done- neg for dissection. NST showed infr wall changes. Cath today- non obstructive CAD. ECHO also with mod- sev AI. ARB for afterload reduction. d/c home today.    2) Smoker - Nicotine Patch.  I strongly encouraged the patient to quit smoking.  I outlined the extensive negative impact of smoking on quality of life and the numerous consequences of continued smoking including lung disease, heart attack, stroke, vascular disease, malignancy, increased mortality, etc.  The patient understood these effects and agreed on smoking cessation.    3) Hypothyroid- Cont home meds    Lovenox

## 2018-08-29 NOTE — PROGRESS NOTE ADULT - SUBJECTIVE AND OBJECTIVE BOX
CHIEF COMPLAINT:Patient is a 53y old  Female who presents with a chief complaint of left hand pain (28 Aug 2018 14:32)  pt with AI as well as abnormal stress test.  Doing better today  S/p LHC, nonobstructive CAD.  Echo with moderate to severe AI. LV function and size is normal     Allergies  aspirin (Unknown)  codeine (Unknown)  	  MEDICATIONS:  losartan 25 milliGRAM(s) Oral daily  nitroglycerin     SubLingual 0.4 milliGRAM(s) SubLingual every 5 minutes PRN  ondansetron Injectable 4 milliGRAM(s) IV Push every 6 hours PRN  oxyCODONE    5 mG/acetaminophen 325 mG 1 Tablet(s) Oral every 6 hours PRN  atorvastatin 20 milliGRAM(s) Oral at bedtime  levothyroxine 75 MICROGram(s) Oral daily  heparin  Injectable 5000 Unit(s) SubCutaneous every 8 hours    PHYSICAL EXAM:  T(C): 36.8 (08-29-18 @ 07:20), Max: 37 (08-29-18 @ 06:45)  HR: 79 (08-29-18 @ 11:10) (57 - 79)  BP: 139/78 (08-29-18 @ 11:10) (112/66 - 156/67)  RR: 18 (08-29-18 @ 11:10) (12 - 20)  SpO2: 98% (08-29-18 @ 11:10) (97% - 100%)  Appearance: Normal	  HEENT:   NC/AT  Eye: Pink Conjunctiva  Lungs: CTA B/L  CVS: RRR, Normal S1 and S2, No Edema  Pulses: Normal distal pulses.  Neuro: A&O x3    LABS:	 	                         15.0   6.7   )-----------( 170      ( 28 Aug 2018 06:32 )             45.6     08-28    141  |  106  |  26.0<H>  ----------------------------<  102  4.4   |  24.0  |  0.84    Ca    8.9      28 Aug 2018 06:32  Phos  4.1     08-28  Mg     2.2     08-28    TPro  6.7  /  Alb  3.9  /  TBili  0.2<L>  /  DBili  x   /  AST  14  /  ALT  12  /  AlkPhos  38<L>  08-28    ASSESSMENT/PLAN: 	  1. AI, no need for surgery at this time  2. Use ARB for afterload reduction.  3. Advised pt to stop smoking  4. FU with me in 6 months or if there is change in symptoms such as cp or sob.

## 2018-08-29 NOTE — PROGRESS NOTE ADULT - PROBLEM SELECTOR PLAN 1
admit to telemetry  EKG reviewed-lateral and inferior lead T wave inversion noted  EKG prn chest pain  NTG prn chest pain  ASA held as pt reports allergy   BB, ACE, statin started at low dose  serial troponin, first negative  TTE  HbA1c  lipid panel  cardiology consulted- [facility] cardiology

## 2018-09-28 DIAGNOSIS — F17.200 NICOTINE DEPENDENCE, UNSPECIFIED, UNCOMPLICATED: ICD-10-CM

## 2018-09-28 DIAGNOSIS — I24.9 ACUTE ISCHEMIC HEART DISEASE, UNSPECIFIED: ICD-10-CM

## 2018-09-28 PROBLEM — E03.9 HYPOTHYROIDISM, UNSPECIFIED: Chronic | Status: ACTIVE | Noted: 2018-08-28

## 2018-10-29 ENCOUNTER — OTHER (OUTPATIENT)
Age: 53
End: 2018-10-29

## 2018-11-09 ENCOUNTER — EMERGENCY (EMERGENCY)
Facility: HOSPITAL | Age: 53
LOS: 1 days | Discharge: HOME | End: 2018-11-09
Attending: EMERGENCY MEDICINE
Payer: SELF-PAY

## 2018-11-09 VITALS
OXYGEN SATURATION: 96 % | RESPIRATION RATE: 18 BRPM | HEART RATE: 94 BPM | WEIGHT: 110.01 LBS | HEIGHT: 59 IN | TEMPERATURE: 99 F | DIASTOLIC BLOOD PRESSURE: 91 MMHG | SYSTOLIC BLOOD PRESSURE: 164 MMHG

## 2018-11-09 PROCEDURE — 93010 ELECTROCARDIOGRAM REPORT: CPT

## 2018-11-09 PROCEDURE — 99285 EMERGENCY DEPT VISIT HI MDM: CPT | Mod: 25

## 2018-11-09 PROCEDURE — 99053 MED SERV 10PM-8AM 24 HR FAC: CPT

## 2018-11-10 VITALS
SYSTOLIC BLOOD PRESSURE: 109 MMHG | DIASTOLIC BLOOD PRESSURE: 53 MMHG | OXYGEN SATURATION: 98 % | TEMPERATURE: 99 F | HEART RATE: 90 BPM | RESPIRATION RATE: 18 BRPM

## 2018-11-10 LAB
ALBUMIN SERPL ELPH-MCNC: 3.9 G/DL — SIGNIFICANT CHANGE UP (ref 3.3–5.2)
ALP SERPL-CCNC: 50 U/L — SIGNIFICANT CHANGE UP (ref 40–120)
ALT FLD-CCNC: 24 U/L — SIGNIFICANT CHANGE UP
ANION GAP SERPL CALC-SCNC: 12 MMOL/L — SIGNIFICANT CHANGE UP (ref 5–17)
ANISOCYTOSIS BLD QL: SLIGHT — SIGNIFICANT CHANGE UP
APPEARANCE UR: ABNORMAL
APTT BLD: 33.7 SEC — SIGNIFICANT CHANGE UP (ref 27.5–36.3)
AST SERPL-CCNC: 21 U/L — SIGNIFICANT CHANGE UP
BACTERIA # UR AUTO: ABNORMAL
BASOPHILS # BLD AUTO: 0 K/UL — SIGNIFICANT CHANGE UP (ref 0–0.2)
BASOPHILS NFR BLD AUTO: 0 % — SIGNIFICANT CHANGE UP (ref 0–2)
BILIRUB SERPL-MCNC: 0.4 MG/DL — SIGNIFICANT CHANGE UP (ref 0.4–2)
BILIRUB UR-MCNC: NEGATIVE — SIGNIFICANT CHANGE UP
BUN SERPL-MCNC: 8 MG/DL — SIGNIFICANT CHANGE UP (ref 8–20)
BURR CELLS BLD QL SMEAR: PRESENT — SIGNIFICANT CHANGE UP
CALCIUM SERPL-MCNC: 9 MG/DL — SIGNIFICANT CHANGE UP (ref 8.6–10.2)
CHLORIDE SERPL-SCNC: 104 MMOL/L — SIGNIFICANT CHANGE UP (ref 98–107)
CK SERPL-CCNC: 53 U/L — SIGNIFICANT CHANGE UP (ref 25–170)
CO2 SERPL-SCNC: 24 MMOL/L — SIGNIFICANT CHANGE UP (ref 22–29)
COLOR SPEC: YELLOW — SIGNIFICANT CHANGE UP
CREAT SERPL-MCNC: 0.48 MG/DL — LOW (ref 0.5–1.3)
DIFF PNL FLD: NEGATIVE — SIGNIFICANT CHANGE UP
EOSINOPHIL # BLD AUTO: 0.1 K/UL — SIGNIFICANT CHANGE UP (ref 0–0.5)
EOSINOPHIL NFR BLD AUTO: 2 % — SIGNIFICANT CHANGE UP (ref 0–5)
EPI CELLS # UR: SIGNIFICANT CHANGE UP
GLUCOSE SERPL-MCNC: 118 MG/DL — HIGH (ref 70–115)
GLUCOSE UR QL: NEGATIVE MG/DL — SIGNIFICANT CHANGE UP
HCT VFR BLD CALC: 37.3 % — SIGNIFICANT CHANGE UP (ref 37–47)
HGB BLD-MCNC: 12.5 G/DL — SIGNIFICANT CHANGE UP (ref 12–16)
HYPOCHROMIA BLD QL: SLIGHT — SIGNIFICANT CHANGE UP
INR BLD: 1.05 RATIO — SIGNIFICANT CHANGE UP (ref 0.88–1.16)
KETONES UR-MCNC: ABNORMAL
LEUKOCYTE ESTERASE UR-ACNC: NEGATIVE — SIGNIFICANT CHANGE UP
LIDOCAIN IGE QN: 18 U/L — LOW (ref 22–51)
LYMPHOCYTES # BLD AUTO: 1.6 K/UL — SIGNIFICANT CHANGE UP (ref 1–4.8)
LYMPHOCYTES # BLD AUTO: 17 % — LOW (ref 20–55)
MAGNESIUM SERPL-MCNC: 1.9 MG/DL — SIGNIFICANT CHANGE UP (ref 1.6–2.6)
MCHC RBC-ENTMCNC: 30.9 PG — SIGNIFICANT CHANGE UP (ref 27–31)
MCHC RBC-ENTMCNC: 33.5 G/DL — SIGNIFICANT CHANGE UP (ref 32–36)
MCV RBC AUTO: 92.3 FL — SIGNIFICANT CHANGE UP (ref 81–99)
MONOCYTES # BLD AUTO: 0.7 K/UL — SIGNIFICANT CHANGE UP (ref 0–0.8)
MONOCYTES NFR BLD AUTO: 9 % — SIGNIFICANT CHANGE UP (ref 3–10)
NEUTROPHILS # BLD AUTO: 6.9 K/UL — SIGNIFICANT CHANGE UP (ref 1.8–8)
NEUTROPHILS NFR BLD AUTO: 69 % — SIGNIFICANT CHANGE UP (ref 37–73)
NEUTS BAND # BLD: 1 % — SIGNIFICANT CHANGE UP (ref 0–8)
NITRITE UR-MCNC: NEGATIVE — SIGNIFICANT CHANGE UP
PH UR: 6.5 — SIGNIFICANT CHANGE UP (ref 5–8)
PLAT MORPH BLD: NORMAL — SIGNIFICANT CHANGE UP
PLATELET # BLD AUTO: 163 K/UL — SIGNIFICANT CHANGE UP (ref 150–400)
POIKILOCYTOSIS BLD QL AUTO: SLIGHT — SIGNIFICANT CHANGE UP
POTASSIUM SERPL-MCNC: 3.6 MMOL/L — SIGNIFICANT CHANGE UP (ref 3.5–5.3)
POTASSIUM SERPL-SCNC: 3.6 MMOL/L — SIGNIFICANT CHANGE UP (ref 3.5–5.3)
PROT SERPL-MCNC: 6.9 G/DL — SIGNIFICANT CHANGE UP (ref 6.6–8.7)
PROT UR-MCNC: NEGATIVE MG/DL — SIGNIFICANT CHANGE UP
PROTHROM AB SERPL-ACNC: 12.1 SEC — SIGNIFICANT CHANGE UP (ref 10–12.9)
RBC # BLD: 4.04 M/UL — LOW (ref 4.4–5.2)
RBC # FLD: 12.7 % — SIGNIFICANT CHANGE UP (ref 11–15.6)
RBC BLD AUTO: ABNORMAL
SODIUM SERPL-SCNC: 140 MMOL/L — SIGNIFICANT CHANGE UP (ref 135–145)
SP GR SPEC: 1 — LOW (ref 1.01–1.02)
TROPONIN T SERPL-MCNC: <0.01 NG/ML — SIGNIFICANT CHANGE UP (ref 0–0.06)
TSH SERPL-MCNC: <0.1 UIU/ML — LOW (ref 0.27–4.2)
URATE SERPL-MCNC: 4.6 MG/DL — SIGNIFICANT CHANGE UP (ref 2.4–5.7)
UROBILINOGEN FLD QL: NEGATIVE MG/DL — SIGNIFICANT CHANGE UP
VARIANT LYMPHS # BLD: 2 % — SIGNIFICANT CHANGE UP (ref 0–6)
WBC # BLD: 9.4 K/UL — SIGNIFICANT CHANGE UP (ref 4.8–10.8)
WBC # FLD AUTO: 9.4 K/UL — SIGNIFICANT CHANGE UP (ref 4.8–10.8)
WBC UR QL: SIGNIFICANT CHANGE UP

## 2018-11-10 PROCEDURE — 93005 ELECTROCARDIOGRAM TRACING: CPT

## 2018-11-10 PROCEDURE — 84484 ASSAY OF TROPONIN QUANT: CPT

## 2018-11-10 PROCEDURE — 96374 THER/PROPH/DIAG INJ IV PUSH: CPT

## 2018-11-10 PROCEDURE — 85610 PROTHROMBIN TIME: CPT

## 2018-11-10 PROCEDURE — 71046 X-RAY EXAM CHEST 2 VIEWS: CPT

## 2018-11-10 PROCEDURE — 99284 EMERGENCY DEPT VISIT MOD MDM: CPT | Mod: 25

## 2018-11-10 PROCEDURE — 83735 ASSAY OF MAGNESIUM: CPT

## 2018-11-10 PROCEDURE — 36415 COLL VENOUS BLD VENIPUNCTURE: CPT

## 2018-11-10 PROCEDURE — 84443 ASSAY THYROID STIM HORMONE: CPT

## 2018-11-10 PROCEDURE — 71046 X-RAY EXAM CHEST 2 VIEWS: CPT | Mod: 26

## 2018-11-10 PROCEDURE — 80053 COMPREHEN METABOLIC PANEL: CPT

## 2018-11-10 PROCEDURE — 85730 THROMBOPLASTIN TIME PARTIAL: CPT

## 2018-11-10 PROCEDURE — 85027 COMPLETE CBC AUTOMATED: CPT

## 2018-11-10 PROCEDURE — 84550 ASSAY OF BLOOD/URIC ACID: CPT

## 2018-11-10 PROCEDURE — 83690 ASSAY OF LIPASE: CPT

## 2018-11-10 PROCEDURE — 94640 AIRWAY INHALATION TREATMENT: CPT

## 2018-11-10 PROCEDURE — 82550 ASSAY OF CK (CPK): CPT

## 2018-11-10 PROCEDURE — 81001 URINALYSIS AUTO W/SCOPE: CPT

## 2018-11-10 RX ORDER — IPRATROPIUM/ALBUTEROL SULFATE 18-103MCG
3 AEROSOL WITH ADAPTER (GRAM) INHALATION ONCE
Qty: 0 | Refills: 0 | Status: COMPLETED | OUTPATIENT
Start: 2018-11-10 | End: 2018-11-10

## 2018-11-10 RX ORDER — ALBUTEROL 90 UG/1
2 AEROSOL, METERED ORAL
Qty: 1 | Refills: 0 | OUTPATIENT
Start: 2018-11-10 | End: 2018-11-16

## 2018-11-10 RX ORDER — ALBUTEROL 90 UG/1
2 AEROSOL, METERED ORAL ONCE
Qty: 0 | Refills: 0 | Status: COMPLETED | OUTPATIENT
Start: 2018-11-10 | End: 2018-11-10

## 2018-11-10 RX ADMIN — Medication 125 MILLIGRAM(S): at 03:13

## 2018-11-10 RX ADMIN — Medication 3 MILLILITER(S): at 03:13

## 2018-11-10 RX ADMIN — ALBUTEROL 2 PUFF(S): 90 AEROSOL, METERED ORAL at 05:27

## 2018-11-10 RX ADMIN — Medication 3 MILLILITER(S): at 05:27

## 2018-11-10 NOTE — ED PROVIDER NOTE - MEDICAL DECISION MAKING DETAILS
54 y/o female followed by cardio. Negative cath in august. On Levaquin. Presents for cough and congestion. Plan to give respiratory tx and re-eval.

## 2018-11-10 NOTE — ED PROVIDER NOTE - OBJECTIVE STATEMENT
54 y/o female, former smoker,  presents to the ED c/o CP that onset today. She describes her CP as a sharp pain that comes and goes. Last episode was about 30 mins ago. Pt went to the PCP today and was told that she had crackling in L lung. Also notes blood in stool for the past few months that comes and goes. Denies chills, nausea, vomiting, or leg pain,. Was started on Levaquin recently and got a neb treatment there because of her difficulty breathing. took ibuprofen about 12 hours PTA for her fever. She did have a flu vaccine a few days ago. Notes intermittent episodes of numbness throughout her body.   	Pt had a recent stress echo exam. She was noted to have a leaky valve. She has been compliant with her medication. Had colonoscopy a few years ago, was due for a f/u but pt never followed up.  Cardio: Dr. Rodriguez.

## 2018-11-10 NOTE — ED PROVIDER NOTE - NS ED ROS FT
no weight change, no fever or chills  no recent travel, no recent abox, no sick contacts  no recent change in medications  no rash, no bruises  no visual changes no eye discharge  no cough cold or congestion,   (+) sob, (+) chest pain  no orthopnea, no pnd  no abd pain, no n/v/d  no hematuria, no change in urinary habits  no joint pain, no deformity  no headache, no paresthesia

## 2018-11-10 NOTE — ED PROVIDER NOTE - PHYSICAL EXAMINATION
Constitutional : Appears comfortably, talking in full sentences  Head : TM no erythema noted. no cervical adenopathy  Eyes :eomi, no swelling  Mouth :mm moist,  Neck : supple, trachea in midline  Chest : When pt coughs, hear wheezes R>L.   Heart :S1 S2 distant  Abdomen :abd soft, non tender  Musc/Skel :ext no swelling, no deformity, no spine tenderness, distal pulses present  Neuro  :AAO 3 no focal deficits

## 2018-12-06 ENCOUNTER — APPOINTMENT (OUTPATIENT)
Dept: CARDIOLOGY | Facility: CLINIC | Age: 53
End: 2018-12-06

## 2018-12-22 ENCOUNTER — EMERGENCY (EMERGENCY)
Facility: HOSPITAL | Age: 53
LOS: 1 days | Discharge: HOME | End: 2018-12-22
Attending: EMERGENCY MEDICINE
Payer: SELF-PAY

## 2018-12-22 VITALS
TEMPERATURE: 98 F | RESPIRATION RATE: 18 BRPM | SYSTOLIC BLOOD PRESSURE: 165 MMHG | HEART RATE: 105 BPM | OXYGEN SATURATION: 98 % | HEIGHT: 71 IN | DIASTOLIC BLOOD PRESSURE: 81 MMHG | WEIGHT: 110.01 LBS

## 2018-12-22 LAB
APPEARANCE UR: CLEAR — SIGNIFICANT CHANGE UP
BACTERIA # UR AUTO: ABNORMAL
BILIRUB UR-MCNC: NEGATIVE — SIGNIFICANT CHANGE UP
COLOR SPEC: YELLOW — SIGNIFICANT CHANGE UP
DIFF PNL FLD: ABNORMAL
EPI CELLS # UR: SIGNIFICANT CHANGE UP
GLUCOSE UR QL: NEGATIVE MG/DL — SIGNIFICANT CHANGE UP
HCG UR QL: NEGATIVE — SIGNIFICANT CHANGE UP
KETONES UR-MCNC: NEGATIVE — SIGNIFICANT CHANGE UP
LEUKOCYTE ESTERASE UR-ACNC: ABNORMAL
NITRITE UR-MCNC: NEGATIVE — SIGNIFICANT CHANGE UP
PH UR: 6 — SIGNIFICANT CHANGE UP (ref 5–8)
PROT UR-MCNC: 15 MG/DL
RBC CASTS # UR COMP ASSIST: SIGNIFICANT CHANGE UP /HPF (ref 0–4)
SP GR SPEC: 1.01 — SIGNIFICANT CHANGE UP (ref 1.01–1.02)
UROBILINOGEN FLD QL: NEGATIVE MG/DL — SIGNIFICANT CHANGE UP
WBC UR QL: SIGNIFICANT CHANGE UP

## 2018-12-22 PROCEDURE — 81025 URINE PREGNANCY TEST: CPT

## 2018-12-22 PROCEDURE — 87086 URINE CULTURE/COLONY COUNT: CPT

## 2018-12-22 PROCEDURE — 81001 URINALYSIS AUTO W/SCOPE: CPT

## 2018-12-22 PROCEDURE — 99284 EMERGENCY DEPT VISIT MOD MDM: CPT

## 2018-12-22 PROCEDURE — 99283 EMERGENCY DEPT VISIT LOW MDM: CPT

## 2018-12-22 RX ORDER — DIPHENHYDRAMINE HCL 50 MG
25 CAPSULE ORAL ONCE
Qty: 0 | Refills: 0 | Status: COMPLETED | OUTPATIENT
Start: 2018-12-22 | End: 2018-12-22

## 2018-12-22 RX ORDER — FAMOTIDINE 10 MG/ML
40 INJECTION INTRAVENOUS ONCE
Qty: 0 | Refills: 0 | Status: COMPLETED | OUTPATIENT
Start: 2018-12-22 | End: 2018-12-22

## 2018-12-22 RX ORDER — DIPHENHYDRAMINE HCL 50 MG
1 CAPSULE ORAL
Qty: 15 | Refills: 0 | OUTPATIENT
Start: 2018-12-22 | End: 2018-12-26

## 2018-12-22 RX ORDER — FAMOTIDINE 10 MG/ML
1 INJECTION INTRAVENOUS
Qty: 10 | Refills: 0 | OUTPATIENT
Start: 2018-12-22 | End: 2018-12-26

## 2018-12-22 RX ORDER — LEVOTHYROXINE SODIUM 125 MCG
1 TABLET ORAL
Qty: 0 | Refills: 0 | COMMUNITY

## 2018-12-22 RX ORDER — FAMOTIDINE 10 MG/ML
1 INJECTION INTRAVENOUS
Qty: 5 | Refills: 0 | OUTPATIENT
Start: 2018-12-22 | End: 2018-12-26

## 2018-12-22 RX ADMIN — Medication 25 MILLIGRAM(S): at 09:14

## 2018-12-22 RX ADMIN — Medication 60 MILLIGRAM(S): at 09:13

## 2018-12-22 RX ADMIN — FAMOTIDINE 40 MILLIGRAM(S): 10 INJECTION INTRAVENOUS at 09:13

## 2018-12-22 NOTE — ED PROVIDER NOTE - OBJECTIVE STATEMENT
This is a 53 year old female with c/o rash x 1 day.  She notes was RX Levaquin and started to have rash today.  She reports PCP treating her for URI.  She admits to cough, cold and congestion.  She notes after starting antibiotics she began urinating frequently and is concerned she has a UTI/yeast infection.  She denies any fevers. She notes rash is itchy in nature.  She reports no n/v/d or any recent travel.

## 2018-12-22 NOTE — ED ADULT NURSE NOTE - OBJECTIVE STATEMENT
Pt admitted to ED c/o generalized rash x 1 day, pt took 1st dose of Levaquin yesterday.  No resp symptoms

## 2018-12-22 NOTE — ED ADULT TRIAGE NOTE - CHIEF COMPLAINT QUOTE
Patient arrived to ED today with c/o rash to her body since this morning.  Patient states she believes the rash is due to medication she is recently taking.

## 2018-12-22 NOTE — ED PROVIDER NOTE - ATTENDING CONTRIBUTION TO CARE
Pa care supervised by me and I examined patient who c/o rash after starting Levaquin and pt also had it 2 weeks ago acording to her  PE diffuse hives no mucosal lesions suspect allergic reaction to Levaquin but will rec stop atorvastatin and Losaartin as well  and take H1 and H2 blockers as well as Medrol dosepack and close f/u derm and allergist and return if symptoms worsen

## 2018-12-22 NOTE — ED PROVIDER NOTE - GASTROINTESTINAL NEGATIVE STATEMENT, MLM
Normal for race no abdominal pain, no bloating, no constipation, no diarrhea, no nausea and no vomiting.

## 2018-12-23 LAB
CULTURE RESULTS: NO GROWTH — SIGNIFICANT CHANGE UP
SPECIMEN SOURCE: SIGNIFICANT CHANGE UP

## 2019-01-23 PROBLEM — I38 ENDOCARDITIS, VALVE UNSPECIFIED: Chronic | Status: ACTIVE | Noted: 2018-12-22

## 2019-03-07 ENCOUNTER — NON-APPOINTMENT (OUTPATIENT)
Age: 54
End: 2019-03-07

## 2019-03-07 ENCOUNTER — APPOINTMENT (OUTPATIENT)
Dept: CARDIOLOGY | Facility: CLINIC | Age: 54
End: 2019-03-07
Payer: SELF-PAY

## 2019-03-07 VITALS
DIASTOLIC BLOOD PRESSURE: 76 MMHG | RESPIRATION RATE: 16 BRPM | OXYGEN SATURATION: 96 % | HEIGHT: 59 IN | SYSTOLIC BLOOD PRESSURE: 158 MMHG | HEART RATE: 76 BPM | WEIGHT: 112 LBS | BODY MASS INDEX: 22.58 KG/M2

## 2019-03-07 VITALS — DIASTOLIC BLOOD PRESSURE: 64 MMHG | SYSTOLIC BLOOD PRESSURE: 158 MMHG

## 2019-03-07 DIAGNOSIS — Z87.891 PERSONAL HISTORY OF NICOTINE DEPENDENCE: ICD-10-CM

## 2019-03-07 DIAGNOSIS — Z78.9 OTHER SPECIFIED HEALTH STATUS: ICD-10-CM

## 2019-03-07 DIAGNOSIS — Z82.49 FAMILY HISTORY OF ISCHEMIC HEART DISEASE AND OTHER DISEASES OF THE CIRCULATORY SYSTEM: ICD-10-CM

## 2019-03-07 DIAGNOSIS — Z87.09 PERSONAL HISTORY OF OTHER DISEASES OF THE RESPIRATORY SYSTEM: ICD-10-CM

## 2019-03-07 PROCEDURE — 99213 OFFICE O/P EST LOW 20 MIN: CPT

## 2019-03-07 PROCEDURE — 93000 ELECTROCARDIOGRAM COMPLETE: CPT

## 2019-03-07 NOTE — ASSESSMENT
[FreeTextEntry1] : 1. BP high, start amlodipine\par 2. Losartan renewed\par 3. CP atypical not from cardiac etiology\par 4. BP check by PMD\par 5. TTE with AI, moderate to severe\par 6. Holter, pt has no insurance and would like to hold off\par 7. Lipd panel ordered. Will need to dose lipitor based on lipid panel

## 2019-03-07 NOTE — PHYSICAL EXAM
[Normal Appearance] : normal appearance [Well Groomed] : well groomed [General Appearance - In No Acute Distress] : no acute distress [Normal Conjunctiva] : the conjunctiva exhibited no abnormalities [Normal Oral Mucosa] : normal oral mucosa [Normal Oropharynx] : normal oropharynx [Respiration, Rhythm And Depth] : normal respiratory rhythm and effort [Auscultation Breath Sounds / Voice Sounds] : lungs were clear to auscultation bilaterally [Heart Rate And Rhythm] : heart rate and rhythm were normal [Heart Sounds] : normal S1 and S2 [Arterial Pulses Normal] : the arterial pulses were normal [Edema] : no peripheral edema present [FreeTextEntry1] : 3/6 DREA DIETZB.  [Bowel Sounds] : normal bowel sounds [Abdomen Tenderness] : non-tender [Abnormal Walk] : normal gait [Nail Clubbing] : no clubbing of the fingernails [Cyanosis, Localized] : no localized cyanosis [Skin Color & Pigmentation] : normal skin color and pigmentation [Skin Turgor] : normal skin turgor [Oriented To Time, Place, And Person] : oriented to person, place, and time [Impaired Insight] : insight and judgment were intact

## 2019-03-07 NOTE — REVIEW OF SYSTEMS
[Recent Weight Loss (___ Lbs)] : no recent weight loss [Blurry Vision] : no blurred vision [Earache] : no earache [Mouth Sores] : mouth sores [Shortness Of Breath] : no shortness of breath [Dyspnea on exertion] : dyspnea during exertion [Chest Pain] : chest pain [Lower Ext Edema] : no extremity edema [Palpitations] : palpitations [Cough] : no cough [Wheezing] : no wheezing [Abdominal Pain] : abdominal pain [Nausea] : no nausea [Dysuria] : no dysuria [Incontinence] : no incontinence [Joint Pain] : joint pain [Skin: A Rash] : no rash: [Skin Lesions] : no skin lesions [Dizziness] : no dizziness [Confusion] : no confusion was observed [Anxiety] : no anxiety [Easy Bleeding] : no tendency for easy bleeding [Easy Bruising] : no tendency for easy bruising

## 2019-03-07 NOTE — HISTORY OF PRESENT ILLNESS
[FreeTextEntry1] : 55 yo female with atypical chest pain. \par she was seen last August at [facility] with left sided CP. TTE with moderate to severe AI, No dissection on PE on CTA.\par Cardiac cath with normal coronaries. \par She has pain an epigastrium that travels to the left and right diaphragm.\par Increasing pain in middle of the night but not with activity.    \par Occasional palpitations. Has not has blood test. No syncope or presyncope.

## 2019-04-03 ENCOUNTER — APPOINTMENT (OUTPATIENT)
Dept: CARDIOLOGY | Facility: CLINIC | Age: 54
End: 2019-04-03

## 2019-07-03 ENCOUNTER — APPOINTMENT (OUTPATIENT)
Dept: GASTROENTEROLOGY | Facility: CLINIC | Age: 54
End: 2019-07-03
Payer: COMMERCIAL

## 2019-07-03 VITALS
SYSTOLIC BLOOD PRESSURE: 154 MMHG | OXYGEN SATURATION: 98 % | HEIGHT: 59 IN | BODY MASS INDEX: 22.98 KG/M2 | DIASTOLIC BLOOD PRESSURE: 86 MMHG | WEIGHT: 114 LBS | RESPIRATION RATE: 15 BRPM | HEART RATE: 78 BPM

## 2019-07-03 DIAGNOSIS — Z87.09 PERSONAL HISTORY OF OTHER DISEASES OF THE RESPIRATORY SYSTEM: ICD-10-CM

## 2019-07-03 DIAGNOSIS — Z86.79 PERSONAL HISTORY OF OTHER DISEASES OF THE CIRCULATORY SYSTEM: ICD-10-CM

## 2019-07-03 DIAGNOSIS — E03.9 HYPOTHYROIDISM, UNSPECIFIED: ICD-10-CM

## 2019-07-03 PROCEDURE — 99204 OFFICE O/P NEW MOD 45 MIN: CPT

## 2019-07-03 RX ORDER — ALBUTEROL SULFATE 90 UG/1
108 (90 BASE) AEROSOL, METERED RESPIRATORY (INHALATION)
Refills: 0 | Status: DISCONTINUED | COMMUNITY
Start: 2018-12-04 | End: 2019-07-03

## 2019-07-03 NOTE — ASSESSMENT
[FreeTextEntry1] : Atypical chest pain. Symptoms do not suggest GERD, or LPR. No dyspepsia. No history of peptic ulcer disease per blood work will be performed in 3 breath test has been requested. Upper endoscopy offered to the patient to exclude any anatomical defects. The procedure, its risks, benefits, and prepped, were explained to the patient and , who understand and are agreeable to proceed. ASA #2\par Personal history of colon polyp. Recurrent hematochezia. Chronic constipation. Blood work will include thyroid function tests and a surveillance colonoscopy was offered to the patient. The procedure, its risks, benefits, and prepped, were explained to the patient, one stent and is agreeable to proceed. ASA #2. Try light prep will be utilized with split dose. \par Mallampati: #1. \par Patient is an optimal medical condition undergo both procedures. No clearances deemed necessary. Patient was referred from cardiology has she ihas already been cleared for significant coronary disease. Arrangements made. Results to follow.

## 2019-07-03 NOTE — REASON FOR VISIT
[Consultation] : a consultation visit [FreeTextEntry1] : Atypical chest pain, rectal bleeding, personal history of colon polyp

## 2019-07-03 NOTE — HISTORY OF PRESENT ILLNESS
[FreeTextEntry1] : 54-year-old white female with history of smoking, asthma, mild COPD, hypertension, aortic insufficiency, hypothyroidism, and hyperlipidemia surgical history  appendectomy. History is noncontributory, as patient is adopted.\par Patient was referred for evaluation of atypical chest pain. Patient has been experiencing mid to lower chest pain on a daily basis for greater than one year. She was initially evaluated at [facility] on 2018 were acute coronary syndrome was excluded. There a chest CT was negative for pulmonary emboli. No other significant findings identified. A catheterization of the heart was allegedly normal. There significant aortic insufficiency identified. No history of CHF. Recent cardiac evaluated by Dr. Jeffers.  \par Patient alleges that an upper endoscopy was done 20 years ago, but results are unknown p.o. she currently denies dysphagia or odynophagia. She denies having heartburn or regurgitation. No bleeding or weight loss noted. No history of peptic ulcer disease or pancreatitis or biliary colic.\par Patient reports that a colonoscopy was performed at the age of 41 and a colon polyp was identified. Patient was advised short interval followup for which she has been noncompliant. She reports having blood mixed with the stool on a fairly regular basis. The patient does have significant constipation. Moves bowels once every 3-4 days with straining. She utilizes a colon cleanser and paste. And very products. Nonetheless. Bowel movements are rather infrequent and hard and considerable straining. Icterus. No history of hemorrhoids. No recent blood work.

## 2019-07-03 NOTE — CONSULT LETTER
[Dear  ___] : Dear  [unfilled], [Consult Letter:] : I had the pleasure of evaluating your patient, [unfilled]. [Please see my note below.] : Please see my note below. [Consult Closing:] : Thank you very much for allowing me to participate in the care of this patient.  If you have any questions, please do not hesitate to contact me. [Sincerely,] : Sincerely, [FreeTextEntry1] : atypical chest pain. Negative cardiac workup, save aortic insufficiency. Upper endoscopy arranged\par Personal history of colon polyp, chronic constipation, recurrent hematochezia. Colonoscopy arranged.. [FreeTextEntry3] : Josué Dennis MD FACG\par Diplomate American Board of Internal Medicine and Gastroenterolgy\par [facility]\par

## 2019-07-05 LAB
ALBUMIN SERPL ELPH-MCNC: 4.4 G/DL
ALP BLD-CCNC: 47 U/L
ALT SERPL-CCNC: 21 U/L
ANION GAP SERPL CALC-SCNC: 10 MMOL/L
AST SERPL-CCNC: 16 U/L
BASOPHILS # BLD AUTO: 0.04 K/UL
BASOPHILS NFR BLD AUTO: 0.8 %
BILIRUB SERPL-MCNC: 0.4 MG/DL
BUN SERPL-MCNC: 22 MG/DL
CALCIUM SERPL-MCNC: 9.3 MG/DL
CHLORIDE SERPL-SCNC: 104 MMOL/L
CO2 SERPL-SCNC: 28 MMOL/L
CREAT SERPL-MCNC: 0.69 MG/DL
EOSINOPHIL # BLD AUTO: 0.07 K/UL
EOSINOPHIL NFR BLD AUTO: 1.3 %
GLUCOSE SERPL-MCNC: 93 MG/DL
HCT VFR BLD CALC: 44.5 %
HGB BLD-MCNC: 14.4 G/DL
IMM GRANULOCYTES NFR BLD AUTO: 0.2 %
INR PPP: 0.89 RATIO
LYMPHOCYTES # BLD AUTO: 1.77 K/UL
LYMPHOCYTES NFR BLD AUTO: 34.1 %
MAN DIFF?: NORMAL
MCHC RBC-ENTMCNC: 30.4 PG
MCHC RBC-ENTMCNC: 32.4 GM/DL
MCV RBC AUTO: 94.1 FL
MONOCYTES # BLD AUTO: 0.38 K/UL
MONOCYTES NFR BLD AUTO: 7.3 %
NEUTROPHILS # BLD AUTO: 2.92 K/UL
NEUTROPHILS NFR BLD AUTO: 56.3 %
PLATELET # BLD AUTO: 187 K/UL
POTASSIUM SERPL-SCNC: 4.4 MMOL/L
PROT SERPL-MCNC: 6.6 G/DL
PT BLD: 10 SEC
RBC # BLD: 4.73 M/UL
RBC # FLD: 12.6 %
SODIUM SERPL-SCNC: 142 MMOL/L
WBC # FLD AUTO: 5.19 K/UL

## 2019-07-08 LAB — UREA BREATH TEST QL: NEGATIVE

## 2019-08-29 ENCOUNTER — OTHER (OUTPATIENT)
Age: 54
End: 2019-08-29

## 2019-09-04 ENCOUNTER — APPOINTMENT (OUTPATIENT)
Dept: CARDIOLOGY | Facility: CLINIC | Age: 54
End: 2019-09-04
Payer: COMMERCIAL

## 2019-09-04 ENCOUNTER — NON-APPOINTMENT (OUTPATIENT)
Age: 54
End: 2019-09-04

## 2019-09-04 VITALS
RESPIRATION RATE: 14 BRPM | OXYGEN SATURATION: 100 % | HEIGHT: 59 IN | WEIGHT: 116 LBS | HEART RATE: 94 BPM | DIASTOLIC BLOOD PRESSURE: 80 MMHG | BODY MASS INDEX: 23.39 KG/M2 | SYSTOLIC BLOOD PRESSURE: 150 MMHG

## 2019-09-04 VITALS — DIASTOLIC BLOOD PRESSURE: 76 MMHG | SYSTOLIC BLOOD PRESSURE: 146 MMHG

## 2019-09-04 DIAGNOSIS — I34.0 NONRHEUMATIC MITRAL (VALVE) INSUFFICIENCY: ICD-10-CM

## 2019-09-04 PROCEDURE — 93000 ELECTROCARDIOGRAM COMPLETE: CPT

## 2019-09-04 PROCEDURE — 99214 OFFICE O/P EST MOD 30 MIN: CPT | Mod: 25

## 2019-09-11 ENCOUNTER — APPOINTMENT (OUTPATIENT)
Dept: CARDIOLOGY | Facility: CLINIC | Age: 54
End: 2019-09-11
Payer: COMMERCIAL

## 2019-09-11 PROCEDURE — 93306 TTE W/DOPPLER COMPLETE: CPT

## 2020-04-08 RX ORDER — LEVOTHYROXINE SODIUM 0.07 MG/1
75 TABLET ORAL DAILY
Refills: 0 | Status: DISCONTINUED | COMMUNITY
Start: 2018-12-04 | End: 2020-04-08

## 2020-06-18 ENCOUNTER — APPOINTMENT (OUTPATIENT)
Dept: CARDIOLOGY | Facility: CLINIC | Age: 55
End: 2020-06-18

## 2020-09-03 ENCOUNTER — APPOINTMENT (OUTPATIENT)
Dept: CARDIOLOGY | Facility: CLINIC | Age: 55
End: 2020-09-03
Payer: COMMERCIAL

## 2020-09-03 VITALS
OXYGEN SATURATION: 97 % | WEIGHT: 118 LBS | DIASTOLIC BLOOD PRESSURE: 68 MMHG | TEMPERATURE: 98.3 F | HEART RATE: 75 BPM | BODY MASS INDEX: 23.83 KG/M2 | SYSTOLIC BLOOD PRESSURE: 147 MMHG

## 2020-09-03 VITALS — DIASTOLIC BLOOD PRESSURE: 70 MMHG | SYSTOLIC BLOOD PRESSURE: 146 MMHG

## 2020-09-03 PROCEDURE — 99214 OFFICE O/P EST MOD 30 MIN: CPT

## 2020-09-03 PROCEDURE — 93000 ELECTROCARDIOGRAM COMPLETE: CPT

## 2020-09-11 ENCOUNTER — NON-APPOINTMENT (OUTPATIENT)
Age: 55
End: 2020-09-11

## 2020-10-06 ENCOUNTER — APPOINTMENT (OUTPATIENT)
Dept: CARDIOLOGY | Facility: CLINIC | Age: 55
End: 2020-10-06
Payer: COMMERCIAL

## 2020-10-06 PROCEDURE — 93306 TTE W/DOPPLER COMPLETE: CPT

## 2020-10-08 ENCOUNTER — APPOINTMENT (OUTPATIENT)
Dept: CARDIOLOGY | Facility: CLINIC | Age: 55
End: 2020-10-08

## 2020-11-04 ENCOUNTER — APPOINTMENT (OUTPATIENT)
Dept: NEUROLOGY | Facility: CLINIC | Age: 55
End: 2020-11-04
Payer: COMMERCIAL

## 2020-11-04 VITALS — TEMPERATURE: 97.8 F | BODY MASS INDEX: 21.17 KG/M2 | HEIGHT: 59 IN | WEIGHT: 105 LBS

## 2020-11-04 PROCEDURE — 99072 ADDL SUPL MATRL&STAF TM PHE: CPT

## 2020-11-04 PROCEDURE — 99204 OFFICE O/P NEW MOD 45 MIN: CPT

## 2020-11-09 ENCOUNTER — APPOINTMENT (OUTPATIENT)
Dept: PULMONOLOGY | Facility: CLINIC | Age: 55
End: 2020-11-09
Payer: COMMERCIAL

## 2020-11-09 VITALS — HEART RATE: 71 BPM | OXYGEN SATURATION: 95 % | RESPIRATION RATE: 16 BRPM

## 2020-11-09 VITALS — SYSTOLIC BLOOD PRESSURE: 120 MMHG | BODY MASS INDEX: 23.23 KG/M2 | WEIGHT: 115 LBS | DIASTOLIC BLOOD PRESSURE: 80 MMHG

## 2020-11-09 PROCEDURE — 99205 OFFICE O/P NEW HI 60 MIN: CPT | Mod: 25

## 2020-11-09 PROCEDURE — 99406 BEHAV CHNG SMOKING 3-10 MIN: CPT

## 2020-11-09 PROCEDURE — 99072 ADDL SUPL MATRL&STAF TM PHE: CPT

## 2020-11-09 RX ORDER — AZITHROMYCIN 250 MG/1
250 TABLET, FILM COATED ORAL
Qty: 1 | Refills: 0 | Status: COMPLETED | COMMUNITY
Start: 2020-10-26 | End: 2020-11-09

## 2020-11-09 RX ORDER — METHYLPREDNISOLONE 4 MG/1
4 TABLET ORAL
Qty: 1 | Refills: 0 | Status: COMPLETED | COMMUNITY
Start: 2020-10-26 | End: 2020-11-09

## 2020-11-09 NOTE — DISCUSSION/SUMMARY
[FreeTextEntry1] : Vaping /nicotine  addiction/FELIZ x 2-3 months, post Covid April by hx\par New decrease in LV function noted, Cardiology following, negative cath 2018\par Discussed with pt, differential includes post Covid residual, vape associated , RB-ILD associated to nicotine inhalation, less likely acute Covid given previous infection and lack of acute infectious complaints\par Stat Covid, d dimer, ( CTA if positive) , inflammatory markers\par prednisone taper, prn abx\par Vaping cessation discussed in detail\par Pfts, prn Proair for hx asthma\par repeat CT scan 6 weeks, follow up post \par Advised ER if any change in status\par

## 2020-11-09 NOTE — CONSULT LETTER
[Dear  ___] : Dear  [unfilled], [Consult Letter:] : I had the pleasure of evaluating your patient, [unfilled]. [Please see my note below.] : Please see my note below. [Sincerely,] : Sincerely, [FreeTextEntry3] : Kamran Nielsen DO [facility]\par Pulmonary Critical Care\par Director Pulmonary Division\par Medical Director Respiratory Therapy\par [facility]\par \par

## 2020-11-09 NOTE — REASON FOR VISIT
[Initial] : an initial visit [Abnormal CXR/ Chest CT] : an abnormal CXR/ chest CT [Chest Pain] : chest pain [Nicotine Dependence] : nicotine dependence

## 2020-11-09 NOTE — PHYSICAL EXAM
[No Acute Distress] : no acute distress [Normal Appearance] : normal appearance [Normal Rate/Rhythm] : normal rate/rhythm [Normal S1, S2] : normal s1, s2 [No Murmurs] : no murmurs [No Gallops] : no gallops [No Resp Distress] : no resp distress [No Acc Muscle Use] : no acc muscle use [Normal Rhythm and Effort] : normal rhythm and effort [Clear to Auscultation Bilaterally] : clear to auscultation bilaterally [Normal to Percussion] : normal to percussion [No Abnormalities] : no abnormalities [Benign] : benign [Normal Gait] : normal gait [No Clubbing] : no clubbing [No Cyanosis] : no cyanosis [No Edema] : no edema [Normal Color/ Pigmentation] : normal color/ pigmentation [No Focal Deficits] : no focal deficits [Oriented x3] : oriented x3 [Normal Affect] : normal affect

## 2020-11-09 NOTE — PROCEDURE
[FreeTextEntry1] : CTA 10/20: no PE, faint peripheral GG upper lobes, new from 2018, no large PE seen ( limited)\par no sig emphysema\par \par ECHO EF 45% new\par cath 2018 normal coronary arteries

## 2020-11-09 NOTE — REVIEW OF SYSTEMS
[GERD] : gerd [Numbness] : numbness [Negative] : Musculoskeletal [TextBox_30] : see HPI [TextBox_44] : see HPI

## 2020-11-15 ENCOUNTER — EMERGENCY (EMERGENCY)
Facility: HOSPITAL | Age: 55
LOS: 1 days | Discharge: HOME | End: 2020-11-15
Attending: EMERGENCY MEDICINE
Payer: COMMERCIAL

## 2020-11-15 VITALS
DIASTOLIC BLOOD PRESSURE: 84 MMHG | TEMPERATURE: 98 F | HEIGHT: 59 IN | SYSTOLIC BLOOD PRESSURE: 150 MMHG | WEIGHT: 104.94 LBS | HEART RATE: 78 BPM | RESPIRATION RATE: 16 BRPM | OXYGEN SATURATION: 97 %

## 2020-11-15 LAB
ALBUMIN SERPL ELPH-MCNC: 4.5 G/DL — SIGNIFICANT CHANGE UP (ref 3.3–5.2)
ALP SERPL-CCNC: 52 U/L — SIGNIFICANT CHANGE UP (ref 40–120)
ALT FLD-CCNC: 18 U/L — SIGNIFICANT CHANGE UP
ANION GAP SERPL CALC-SCNC: 11 MMOL/L — SIGNIFICANT CHANGE UP (ref 5–17)
APPEARANCE UR: CLEAR — SIGNIFICANT CHANGE UP
AST SERPL-CCNC: 18 U/L — SIGNIFICANT CHANGE UP
BACTERIA # UR AUTO: ABNORMAL
BASOPHILS # BLD AUTO: 0.03 K/UL — SIGNIFICANT CHANGE UP (ref 0–0.2)
BASOPHILS NFR BLD AUTO: 0.6 % — SIGNIFICANT CHANGE UP (ref 0–2)
BILIRUB SERPL-MCNC: 0.3 MG/DL — LOW (ref 0.4–2)
BILIRUB UR-MCNC: NEGATIVE — SIGNIFICANT CHANGE UP
BUN SERPL-MCNC: 16 MG/DL — SIGNIFICANT CHANGE UP (ref 8–20)
CALCIUM SERPL-MCNC: 9.3 MG/DL — SIGNIFICANT CHANGE UP (ref 8.6–10.2)
CHLORIDE SERPL-SCNC: 104 MMOL/L — SIGNIFICANT CHANGE UP (ref 98–107)
CO2 SERPL-SCNC: 24 MMOL/L — SIGNIFICANT CHANGE UP (ref 22–29)
COLOR SPEC: YELLOW — SIGNIFICANT CHANGE UP
CREAT SERPL-MCNC: 0.6 MG/DL — SIGNIFICANT CHANGE UP (ref 0.5–1.3)
DIFF PNL FLD: NEGATIVE — SIGNIFICANT CHANGE UP
EOSINOPHIL # BLD AUTO: 0.02 K/UL — SIGNIFICANT CHANGE UP (ref 0–0.5)
EOSINOPHIL NFR BLD AUTO: 0.4 % — SIGNIFICANT CHANGE UP (ref 0–6)
EPI CELLS # UR: SIGNIFICANT CHANGE UP
GLUCOSE SERPL-MCNC: 125 MG/DL — HIGH (ref 70–99)
GLUCOSE UR QL: NEGATIVE MG/DL — SIGNIFICANT CHANGE UP
HCT VFR BLD CALC: 40 % — SIGNIFICANT CHANGE UP (ref 34.5–45)
HGB BLD-MCNC: 13.6 G/DL — SIGNIFICANT CHANGE UP (ref 11.5–15.5)
IMM GRANULOCYTES NFR BLD AUTO: 0 % — SIGNIFICANT CHANGE UP (ref 0–1.5)
KETONES UR-MCNC: NEGATIVE — SIGNIFICANT CHANGE UP
LEUKOCYTE ESTERASE UR-ACNC: NEGATIVE — SIGNIFICANT CHANGE UP
LYMPHOCYTES # BLD AUTO: 0.78 K/UL — LOW (ref 1–3.3)
LYMPHOCYTES # BLD AUTO: 15.2 % — SIGNIFICANT CHANGE UP (ref 13–44)
MCHC RBC-ENTMCNC: 32.2 PG — SIGNIFICANT CHANGE UP (ref 27–34)
MCHC RBC-ENTMCNC: 34 GM/DL — SIGNIFICANT CHANGE UP (ref 32–36)
MCV RBC AUTO: 94.8 FL — SIGNIFICANT CHANGE UP (ref 80–100)
MONOCYTES # BLD AUTO: 0.16 K/UL — SIGNIFICANT CHANGE UP (ref 0–0.9)
MONOCYTES NFR BLD AUTO: 3.1 % — SIGNIFICANT CHANGE UP (ref 2–14)
NEUTROPHILS # BLD AUTO: 4.14 K/UL — SIGNIFICANT CHANGE UP (ref 1.8–7.4)
NEUTROPHILS NFR BLD AUTO: 80.7 % — HIGH (ref 43–77)
NITRITE UR-MCNC: NEGATIVE — SIGNIFICANT CHANGE UP
PH UR: 7 — SIGNIFICANT CHANGE UP (ref 5–8)
PLATELET # BLD AUTO: 189 K/UL — SIGNIFICANT CHANGE UP (ref 150–400)
POTASSIUM SERPL-MCNC: 4.3 MMOL/L — SIGNIFICANT CHANGE UP (ref 3.5–5.3)
POTASSIUM SERPL-SCNC: 4.3 MMOL/L — SIGNIFICANT CHANGE UP (ref 3.5–5.3)
PROT SERPL-MCNC: 7.4 G/DL — SIGNIFICANT CHANGE UP (ref 6.6–8.7)
PROT UR-MCNC: 15 MG/DL
RBC # BLD: 4.22 M/UL — SIGNIFICANT CHANGE UP (ref 3.8–5.2)
RBC # FLD: 11.9 % — SIGNIFICANT CHANGE UP (ref 10.3–14.5)
RBC CASTS # UR COMP ASSIST: SIGNIFICANT CHANGE UP /HPF (ref 0–4)
SODIUM SERPL-SCNC: 139 MMOL/L — SIGNIFICANT CHANGE UP (ref 135–145)
SP GR SPEC: 1.01 — SIGNIFICANT CHANGE UP (ref 1.01–1.02)
UROBILINOGEN FLD QL: NEGATIVE MG/DL — SIGNIFICANT CHANGE UP
WBC # BLD: 5.13 K/UL — SIGNIFICANT CHANGE UP (ref 3.8–10.5)
WBC # FLD AUTO: 5.13 K/UL — SIGNIFICANT CHANGE UP (ref 3.8–10.5)
WBC UR QL: SIGNIFICANT CHANGE UP

## 2020-11-15 PROCEDURE — 86757 RICKETTSIA ANTIBODY: CPT

## 2020-11-15 PROCEDURE — 99283 EMERGENCY DEPT VISIT LOW MDM: CPT

## 2020-11-15 PROCEDURE — 86618 LYME DISEASE ANTIBODY: CPT

## 2020-11-15 PROCEDURE — 36415 COLL VENOUS BLD VENIPUNCTURE: CPT

## 2020-11-15 PROCEDURE — 99284 EMERGENCY DEPT VISIT MOD MDM: CPT

## 2020-11-15 PROCEDURE — 85025 COMPLETE CBC W/AUTO DIFF WBC: CPT

## 2020-11-15 PROCEDURE — 86780 TREPONEMA PALLIDUM: CPT

## 2020-11-15 PROCEDURE — 81001 URINALYSIS AUTO W/SCOPE: CPT

## 2020-11-15 PROCEDURE — 80053 COMPREHEN METABOLIC PANEL: CPT

## 2020-11-15 RX ORDER — CEPHALEXIN 500 MG
1 CAPSULE ORAL
Qty: 28 | Refills: 0
Start: 2020-11-15 | End: 2020-11-21

## 2020-11-15 RX ORDER — CEPHALEXIN 500 MG
500 CAPSULE ORAL ONCE
Refills: 0 | Status: COMPLETED | OUTPATIENT
Start: 2020-11-15 | End: 2020-11-15

## 2020-11-15 RX ADMIN — Medication 500 MILLIGRAM(S): at 16:06

## 2020-11-15 NOTE — ED PROVIDER NOTE - PATIENT PORTAL LINK FT
You can access the FollowMyHealth Patient Portal offered by [facility] by registering at the following website: [email or web address]. By joining [employer]’s FollowMyHealth portal, you will also be able to view your health information using other applications (apps) compatible with our system.

## 2020-11-15 NOTE — ED PROVIDER NOTE - OBJECTIVE STATEMENT
54 yo f arrives with multiple reddened areas to B/L hand, right rib cage and foot for one week. One area on her right hand has progressively worsened, now with more redness and pain. Pt went to stat med two days and given topical antibiotic without relief. Was given doxycycline by pulmonologist for unknown respiratory infection on Monday. Denies subjective fevers/chills, abdominal pain, N/V/D, rashes or wounds elsewhere.

## 2020-11-15 NOTE — ED PROVIDER NOTE - NSFOLLOWUPINSTRUCTIONS_ED_ALL_ED_FT
-In the ED you were found to have a rash that has gotten infected  -Take keflex 500MG four times daily for seven days, take th FULL COURSE, even if the rash improves  -Take tylenol 650MG or ibuprofen 600MG every eight hours as needed for pain  -Call to make a follow up appointment with your primary doctor within two weeks  -Return to ED for a fever, or any worsening redness or swelling to rash

## 2020-11-15 NOTE — ED PROVIDER NOTE - PHYSICAL EXAMINATION
gen: well appearing female, NAD   HEENT: airway patent, oral mucosa normal  card: S1S2, rhythm and rate regular, no murmurs or gallops noted  resp: CTAB, no wheezes or crackles   MSK: noted reddened area to left hand, right rib cage, right hand has more pronounced reddened area with irregular defined border, surrounding pain and swelling

## 2020-11-15 NOTE — ED PROVIDER NOTE - NS ED ROS FT
gen: denies fevers/subjective chills, weakness, weight loss or weight gain  HEENT: denies headache, throat pain, nasal congestion  neck: denies stiffness or pain  card: denies chest pain or palpitations  resp: denies cough, SOB  abd: denies abdominal pain, N/V/D  MSK: reports reddened area to B/L hands, right rib cage, and foot, right hand also has pain to site

## 2020-11-15 NOTE — ED PROVIDER NOTE - ATTENDING CONTRIBUTION TO CARE
The patient seen and examined    Non-specific rash  Early cellulitis    I, Vishal Farley, performed the initial face to face bedside interview with this patient regarding history of present illness, review of symptoms and relevant past medical, social and family history.  I completed an independent physical examination.  I was the initial provider who evaluated this patient. I have signed out the follow up of any pending tests (i.e. labs, radiological studies) to the ACP.  I have communicated the patient’s plan of care and disposition with the ACP.

## 2020-11-15 NOTE — ED PROVIDER NOTE - CLINICAL SUMMARY MEDICAL DECISION MAKING FREE TEXT BOX
56 yo female PMH hypothyroidism presents to ED for worsening rash to multiple areas x 1 week. Right hand most pronounced with distinct margins, was given topical antibiotics by stat med without relief. will order, blood work, lyme titer, and rpr, and keflex 500MG. Will continue to monitor.

## 2020-11-16 LAB
B BURGDOR C6 AB SER-ACNC: NEGATIVE — SIGNIFICANT CHANGE UP
B BURGDOR IGG+IGM SER-ACNC: 0.04 INDEX — SIGNIFICANT CHANGE UP (ref 0.01–0.89)
T PALLIDUM AB TITR SER: NEGATIVE — SIGNIFICANT CHANGE UP

## 2020-11-18 LAB
R RICKETTSI AB SER-ACNC: NEGATIVE — SIGNIFICANT CHANGE UP
R RICKETTSI IGM SER-ACNC: 0.24 INDEX — SIGNIFICANT CHANGE UP (ref 0–0.89)
RICK SF IGG TITR SER IF: NEGATIVE — SIGNIFICANT CHANGE UP
RICK SF IGM TITR SER IF: 0.24 INDEX — SIGNIFICANT CHANGE UP (ref 0–0.89)

## 2020-11-19 ENCOUNTER — APPOINTMENT (OUTPATIENT)
Dept: CARDIOLOGY | Facility: CLINIC | Age: 55
End: 2020-11-19
Payer: COMMERCIAL

## 2020-11-19 VITALS
HEART RATE: 82 BPM | TEMPERATURE: 97.4 F | DIASTOLIC BLOOD PRESSURE: 81 MMHG | OXYGEN SATURATION: 98 % | HEIGHT: 59 IN | WEIGHT: 118 LBS | BODY MASS INDEX: 23.79 KG/M2 | SYSTOLIC BLOOD PRESSURE: 132 MMHG

## 2020-11-19 DIAGNOSIS — I25.10 ATHEROSCLEROTIC HEART DISEASE OF NATIVE CORONARY ARTERY W/OUT ANGINA PECTORIS: ICD-10-CM

## 2020-11-19 PROCEDURE — 93000 ELECTROCARDIOGRAM COMPLETE: CPT

## 2020-11-19 PROCEDURE — 99072 ADDL SUPL MATRL&STAF TM PHE: CPT

## 2020-11-19 PROCEDURE — 99214 OFFICE O/P EST MOD 30 MIN: CPT

## 2020-11-19 RX ORDER — NEOMYCIN SULFATE, POLYMYXIN B SULFATE AND DEXAMETHASONE 3.5; 10000; 1 MG/ML; [USP'U]/ML; MG/ML
3.5-10000-0.1 SUSPENSION OPHTHALMIC
Refills: 0 | Status: DISCONTINUED | COMMUNITY
End: 2020-11-19

## 2020-11-19 RX ORDER — ALBUTEROL SULFATE 90 UG/1
108 (90 BASE) INHALANT RESPIRATORY (INHALATION)
Qty: 1 | Refills: 5 | Status: DISCONTINUED | COMMUNITY
Start: 2020-11-09 | End: 2020-11-19

## 2020-12-25 NOTE — ED PROVIDER NOTE - NS ED SCRIBE STATEMENT
Trauma Attending Note     S: no major issues overnight     O:  GEN: NAD, sitting up in commode this morning  Vitals reviewed  Head: no abrasions, contusions  Pulm: No resp distress  CV: RR  Abd: soft nontender  Ext: dressings to left thigh and hip with swelling of thigh  Neuro: no focal deficits noted, awake, appears confused  Skin: No obvious skin breakdown      Relevant chart, labs, and images reviewed     [facility] 89[facility], h/o L hip fx s/p fixation in July at OSH in [facility]  12/23 s/p fixation periprosthetic femur fx     L periprosthetic femur fx - s/p fixation, per ortho, TTWB LLE, PT/OT  H/o DVT on xarelto - on xarelto prophylaxis currently  Orthostatic hypotension - cont home fludrocortisone  Acute blood loss anemia - traumatic and postop - follow, transfuse 1 unit PRBC today  Hypomagnesemia - replace  Xarelto for VTE prophylaxis although will likely need to resume therapeutic anticoag  Dispo pending           I have personally seen and examined the patient with the trauma team including midlevel providers, residents, medical students, and nurses.  The chart, labs, and images were reviewed by me.  I have reviewed the resident notes in the chart and edited them where needed.  Plan of care d/w trauma team and patient. Perez ESCAMILLA   Attending

## 2021-01-02 DIAGNOSIS — Z01.818 ENCOUNTER FOR OTHER PREPROCEDURAL EXAMINATION: ICD-10-CM

## 2021-01-04 ENCOUNTER — APPOINTMENT (OUTPATIENT)
Dept: DISASTER EMERGENCY | Facility: CLINIC | Age: 56
End: 2021-01-04

## 2021-01-05 LAB — SARS-COV-2 N GENE NPH QL NAA+PROBE: NOT DETECTED

## 2021-01-07 ENCOUNTER — APPOINTMENT (OUTPATIENT)
Dept: PULMONOLOGY | Facility: CLINIC | Age: 56
End: 2021-01-07
Payer: COMMERCIAL

## 2021-01-07 VITALS
HEART RATE: 82 BPM | RESPIRATION RATE: 14 BRPM | OXYGEN SATURATION: 98 % | DIASTOLIC BLOOD PRESSURE: 72 MMHG | SYSTOLIC BLOOD PRESSURE: 138 MMHG

## 2021-01-07 VITALS — WEIGHT: 118 LBS | TEMPERATURE: 97.6 F | BODY MASS INDEX: 23.79 KG/M2 | HEIGHT: 59 IN

## 2021-01-07 DIAGNOSIS — R06.02 SHORTNESS OF BREATH: ICD-10-CM

## 2021-01-07 PROCEDURE — 94727 GAS DIL/WSHOT DETER LNG VOL: CPT

## 2021-01-07 PROCEDURE — 99072 ADDL SUPL MATRL&STAF TM PHE: CPT

## 2021-01-07 PROCEDURE — 99406 BEHAV CHNG SMOKING 3-10 MIN: CPT

## 2021-01-07 PROCEDURE — 94010 BREATHING CAPACITY TEST: CPT

## 2021-01-07 PROCEDURE — 99214 OFFICE O/P EST MOD 30 MIN: CPT | Mod: 25

## 2021-01-07 PROCEDURE — 85018 HEMOGLOBIN: CPT | Mod: QW

## 2021-01-07 PROCEDURE — 94729 DIFFUSING CAPACITY: CPT

## 2021-01-07 RX ORDER — PREDNISONE 10 MG/1
10 TABLET ORAL
Qty: 1 | Refills: 3 | Status: DISCONTINUED | COMMUNITY
Start: 2020-11-09 | End: 2021-01-07

## 2021-01-07 NOTE — DISCUSSION/SUMMARY
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

## 2021-01-07 NOTE — CONSULT LETTER
[Dear  ___] : Dear  [unfilled], [Consult Letter:] : I had the pleasure of evaluating your patient, [unfilled]. [Please see my note below.] : Please see my note below. [Sincerely,] : Sincerely, [FreeTextEntry3] : [facility]ran [facility] DO [facility]\par Pulmonary Critical Care\par Director Pulmonary Division\par Medical Director Respiratory Therapy\par [facility]\par \par

## 2021-01-07 NOTE — REASON FOR VISIT
[Abnormal CXR/ Chest CT] : an abnormal CXR/ chest CT [Chest Pain] : chest pain [Nicotine Dependence] : nicotine dependence [Follow-Up] : a follow-up visit

## 2021-01-07 NOTE — HISTORY OF PRESENT ILLNESS
[TextBox_4] : 56 yo female\par smoker x many yrs\par now vaping, nicotine menthol\par States Covid positive in April, never hospitalized, \par feels 85% better\par no sputum\par

## 2021-01-27 ENCOUNTER — APPOINTMENT (OUTPATIENT)
Dept: NEUROLOGY | Facility: CLINIC | Age: 56
End: 2021-01-27
Payer: COMMERCIAL

## 2021-01-27 PROCEDURE — 95911 NRV CNDJ TEST 9-10 STUDIES: CPT

## 2021-01-27 PROCEDURE — 99072 ADDL SUPL MATRL&STAF TM PHE: CPT

## 2021-01-27 PROCEDURE — 95886 MUSC TEST DONE W/N TEST COMP: CPT

## 2021-03-07 ENCOUNTER — EMERGENCY (EMERGENCY)
Facility: HOSPITAL | Age: 56
LOS: 1 days | Discharge: HOME | End: 2021-03-07
Attending: EMERGENCY MEDICINE
Payer: COMMERCIAL

## 2021-03-07 VITALS
OXYGEN SATURATION: 97 % | HEART RATE: 111 BPM | TEMPERATURE: 99 F | DIASTOLIC BLOOD PRESSURE: 76 MMHG | RESPIRATION RATE: 18 BRPM | SYSTOLIC BLOOD PRESSURE: 111 MMHG | HEIGHT: 59 IN

## 2021-03-07 VITALS
HEART RATE: 62 BPM | SYSTOLIC BLOOD PRESSURE: 112 MMHG | DIASTOLIC BLOOD PRESSURE: 66 MMHG | OXYGEN SATURATION: 96 % | TEMPERATURE: 98 F | RESPIRATION RATE: 16 BRPM

## 2021-03-07 DIAGNOSIS — Z90.49 ACQUIRED ABSENCE OF OTHER SPECIFIED PARTS OF DIGESTIVE TRACT: Chronic | ICD-10-CM

## 2021-03-07 PROCEDURE — 96375 TX/PRO/DX INJ NEW DRUG ADDON: CPT

## 2021-03-07 PROCEDURE — 99284 EMERGENCY DEPT VISIT MOD MDM: CPT | Mod: 25

## 2021-03-07 PROCEDURE — 96374 THER/PROPH/DIAG INJ IV PUSH: CPT

## 2021-03-07 PROCEDURE — 99284 EMERGENCY DEPT VISIT MOD MDM: CPT

## 2021-03-07 RX ORDER — FAMOTIDINE 10 MG/ML
1 INJECTION INTRAVENOUS
Qty: 14 | Refills: 0
Start: 2021-03-07 | End: 2021-03-13

## 2021-03-07 RX ORDER — DIPHENHYDRAMINE HCL 50 MG
50 CAPSULE ORAL ONCE
Refills: 0 | Status: COMPLETED | OUTPATIENT
Start: 2021-03-07 | End: 2021-03-07

## 2021-03-07 RX ORDER — FAMOTIDINE 10 MG/ML
20 INJECTION INTRAVENOUS ONCE
Refills: 0 | Status: COMPLETED | OUTPATIENT
Start: 2021-03-07 | End: 2021-03-07

## 2021-03-07 RX ORDER — DIPHENHYDRAMINE HCL 50 MG
1 CAPSULE ORAL
Qty: 20 | Refills: 0
Start: 2021-03-07 | End: 2021-03-11

## 2021-03-07 RX ADMIN — Medication 50 MILLIGRAM(S): at 08:36

## 2021-03-07 RX ADMIN — FAMOTIDINE 20 MILLIGRAM(S): 10 INJECTION INTRAVENOUS at 08:36

## 2021-03-07 RX ADMIN — Medication 125 MILLIGRAM(S): at 08:36

## 2021-03-07 NOTE — ED PROVIDER NOTE - NSFOLLOWUPINSTRUCTIONS_ED_ALL_ED_FT
Allergic Reaction    An allergic reaction is an abnormal reaction to a substance (allergen) by the body's defense system. Common allergens include medicines, food, insect bites or stings, and blood products. The body releases certain proteins into the blood that can cause a variety of symptoms such as an itchy rash, wheezing, swelling of the face/lips/tongue/throat, abdominal pain, nausea or vomiting. An allergic reaction is usually treated with medication. If your health care provider prescribed you an epinephrine injection device, make sure to keep it with you at all times.    SEEK IMMEDIATE MEDICAL CARE IF YOU HAVE ANY OF THE FOLLOWING SYMPTOMS: allergic reaction severe enough that required you to use epinephrine, tightness in your chest, swelling around your lips/tongue/throat, abdominal pain, vomiting or diarrhea, or lightheadedness/dizziness. These symptoms may represent a serious problem that is an emergency. Do not wait to see if the symptoms will go away. Use your auto-injector pen or anaphylaxis kit as you have been instructed. Call 911 and do not drive yourself to the hospital.     Do not take Doxycycline.     Take all medications as prescribed.     You are advised to please follow up with your primary care doctor within the next 24-48 hours and return to the Emergency Department for worsening symptoms or any other concerns.

## 2021-03-07 NOTE — ED ADULT TRIAGE NOTE - CHIEF COMPLAINT QUOTE
pt ambulatory into ED a&ox3, no acute distress, breaths even and unlabored c/o allergic reaction since last night. pt reports she was prescribed doxycycline 100mg from her pulmonologist for a bug bite on her hand, a few weeks a go. pt had allergic reaction to it and threw it away. pt was notified by pharmacy that there was a refill on a medication, she picked it up and took one last night at 10pm not knowing what the medication was for. pt presents now with hives on arms and hands. denies difficulty breathing or swallowing.

## 2021-03-07 NOTE — ED PROVIDER NOTE - PATIENT PORTAL LINK FT
You can access the FollowMyHealth Patient Portal offered by [facility] by registering at the following website: [email or web address]. By joining [employer]’s FollowMyHealth portal, you will also be able to view your health information using other applications (apps) compatible with our system.

## 2021-03-07 NOTE — ED ADULT NURSE NOTE - NSIMPLEMENTINTERV_GEN_ALL_ED
Implemented All Universal Safety Interventions:  [place] to call system. Call bell, personal items and telephone within reach. Instruct patient to call for assistance. Room bathroom lighting operational. Non-slip footwear when patient is off stretcher. Physically safe environment: no spills, clutter or unnecessary equipment. Stretcher in lowest position, wheels locked, appropriate side rails in place.

## 2021-03-07 NOTE — ED PROVIDER NOTE - PROGRESS NOTE DETAILS
pt given Benadryl, Prednisone and Pepcid. Pt monitored for a few hours. No respiratory issues. Speaking in full sentences. Rash improving. Will dc home Rx meds. Pt advised to return to ED with any new concerns or worsening symptoms. Pt verbalized understanding.  Pt will follow up with PCP.

## 2021-03-07 NOTE — ED ADULT NURSE REASSESSMENT NOTE - NS ED NURSE REASSESS COMMENT FT1
pt hemodynamically stable, PIV removed, refer to flowsheet and chart, pt to be discharged, pt understood discharge instructions and plan of care. Pt medically cleared by ANITA Dodd.

## 2021-03-07 NOTE — ED PROVIDER NOTE - OBJECTIVE STATEMENT
55 y/o F with pmhx of hypothyroid and HTN presents to ED c/o allergic reaction causing hives lip swelling and nausea after taking Doxycycline. Pt states he took Doxycycline for a bug bite a few weeks ago. States she had a reaction to it. Not realizing she took it again last night when taking her daily pills. A little while later broke out in diffuse blotches and hives. Denies taking any medication for the reaction.

## 2021-03-07 NOTE — ED ADULT NURSE NOTE - OBJECTIVE STATEMENT
pt alert and oriented x4 came in c/o allergic rx. pt reports taking one doxycycline last night, was up all night, pruritis generalized with rash. RR even unlabored. pt reports no difficulty swallowing or breathing. RR even unlabored. pt educated on plan of care, pt able to successfully teach back plan of care to RN, RN will continue to reeducate pt during hospital stay.

## 2021-03-07 NOTE — ED PROVIDER NOTE - CLINICAL SUMMARY MEDICAL DECISION MAKING FREE TEXT BOX
55 y/o F with allergic reaction to doxycycline. Pt advised to not take again. Pepcid, Steroids and Benadryl. Reassess.

## 2021-03-07 NOTE — ED PROVIDER NOTE - ATTENDING CONTRIBUTION TO CARE
Pt with allergic reaction to doxycycline, no sob, no wheezing,  but has pan body hives.  No lip swelling.  Plan steroids, benadryl

## 2021-03-11 ENCOUNTER — APPOINTMENT (OUTPATIENT)
Dept: CARDIOLOGY | Facility: CLINIC | Age: 56
End: 2021-03-11

## 2021-04-20 ENCOUNTER — APPOINTMENT (OUTPATIENT)
Dept: PULMONOLOGY | Facility: CLINIC | Age: 56
End: 2021-04-20
Payer: COMMERCIAL

## 2021-04-20 VITALS
SYSTOLIC BLOOD PRESSURE: 120 MMHG | HEART RATE: 78 BPM | BODY MASS INDEX: 23.63 KG/M2 | DIASTOLIC BLOOD PRESSURE: 60 MMHG | RESPIRATION RATE: 16 BRPM | WEIGHT: 117 LBS | OXYGEN SATURATION: 98 %

## 2021-04-20 DIAGNOSIS — R91.8 OTHER NONSPECIFIC ABNORMAL FINDING OF LUNG FIELD: ICD-10-CM

## 2021-04-20 DIAGNOSIS — Z86.16 PERSONAL HISTORY OF COVID-19: ICD-10-CM

## 2021-04-20 PROCEDURE — 99072 ADDL SUPL MATRL&STAF TM PHE: CPT

## 2021-04-20 PROCEDURE — 99213 OFFICE O/P EST LOW 20 MIN: CPT | Mod: 25

## 2021-04-20 PROCEDURE — 99406 BEHAV CHNG SMOKING 3-10 MIN: CPT

## 2021-04-20 RX ORDER — DOXYCYCLINE HYCLATE 100 MG/1
100 CAPSULE ORAL
Qty: 10 | Refills: 3 | Status: COMPLETED | COMMUNITY
Start: 2020-11-09 | End: 2021-04-20

## 2021-04-20 RX ORDER — POLYETHYLENE GLYOCOL 3350, SODIUM CHLORIDE, SODIUM BICARBONATE AND POTASSIUM CHLORIDE 420; 11.2; 5.72; 1.48 G/4L; G/4L; G/4L; G/4L
420 POWDER, FOR SOLUTION NASOGASTRIC; ORAL
Qty: 1 | Refills: 0 | Status: COMPLETED | COMMUNITY
Start: 2019-07-03 | End: 2021-04-20

## 2021-04-20 NOTE — REASON FOR VISIT
[Follow-Up] : a follow-up visit [Chest Pain] : chest pain [Nicotine Dependence] : nicotine dependence [Abnormal CXR/ Chest CT] : an abnormal CXR/ chest CT

## 2021-04-20 NOTE — CONSULT LETTER
[Dear  ___] : Dear  [unfilled], [Consult Letter:] : I had the pleasure of evaluating your patient, [unfilled]. [Please see my note below.] : Please see my note below. [Sincerely,] : Sincerely, [FreeTextEntry3] : Kamran Nielsen DO [facility]\par Pulmonary Critical Care\par Director Pulmonary Division\par Medical Director Respiratory Therapy\par [facility]\par \par

## 2021-04-20 NOTE — HISTORY OF PRESENT ILLNESS
[TextBox_4] : 56 yo female\par smoker x many yrs\par still  vaping, nicotine menthol- but not as much\par States Covid positive in April, never hospitalized, \par still with fatigue \par no dyspnea \par no sputum\par had  PFts , are normal\par \par

## 2021-04-21 ENCOUNTER — APPOINTMENT (OUTPATIENT)
Dept: NEUROLOGY | Facility: CLINIC | Age: 56
End: 2021-04-21
Payer: COMMERCIAL

## 2021-04-21 VITALS
DIASTOLIC BLOOD PRESSURE: 80 MMHG | SYSTOLIC BLOOD PRESSURE: 120 MMHG | WEIGHT: 117 LBS | TEMPERATURE: 98.6 F | BODY MASS INDEX: 23.59 KG/M2 | HEIGHT: 59 IN

## 2021-04-21 DIAGNOSIS — R20.2 PARESTHESIA OF SKIN: ICD-10-CM

## 2021-04-21 DIAGNOSIS — G62.9 POLYNEUROPATHY, UNSPECIFIED: ICD-10-CM

## 2021-04-21 PROCEDURE — 99214 OFFICE O/P EST MOD 30 MIN: CPT

## 2021-04-21 PROCEDURE — 99072 ADDL SUPL MATRL&STAF TM PHE: CPT

## 2021-04-28 ENCOUNTER — APPOINTMENT (OUTPATIENT)
Dept: ORTHOPEDIC SURGERY | Facility: CLINIC | Age: 56
End: 2021-04-28
Payer: COMMERCIAL

## 2021-04-28 VITALS
BODY MASS INDEX: 23.59 KG/M2 | DIASTOLIC BLOOD PRESSURE: 83 MMHG | HEART RATE: 70 BPM | SYSTOLIC BLOOD PRESSURE: 150 MMHG | HEIGHT: 59 IN | WEIGHT: 117 LBS

## 2021-04-28 DIAGNOSIS — G56.02 CARPAL TUNNEL SYNDROME, LEFT UPPER LIMB: ICD-10-CM

## 2021-04-28 DIAGNOSIS — G56.01 CARPAL TUNNEL SYNDROME, RIGHT UPPER LIMB: ICD-10-CM

## 2021-04-28 PROCEDURE — 99204 OFFICE O/P NEW MOD 45 MIN: CPT

## 2021-04-28 PROCEDURE — 99072 ADDL SUPL MATRL&STAF TM PHE: CPT

## 2021-07-08 NOTE — ED ADULT NURSE NOTE - NSSISCREENINGQ2_ED_A_ED
No
PAST MEDICAL HISTORY:  COVID-19 vaccine series completed Pfizer. second dose 4/2021    Morbid obesity

## 2021-07-23 ENCOUNTER — APPOINTMENT (OUTPATIENT)
Dept: GASTROENTEROLOGY | Facility: CLINIC | Age: 56
End: 2021-07-23

## 2021-08-04 ENCOUNTER — EMERGENCY (EMERGENCY)
Facility: HOSPITAL | Age: 56
LOS: 1 days | Discharge: HOME | End: 2021-08-04
Attending: STUDENT IN AN ORGANIZED HEALTH CARE EDUCATION/TRAINING PROGRAM
Payer: COMMERCIAL

## 2021-08-04 VITALS
TEMPERATURE: 98 F | HEART RATE: 84 BPM | SYSTOLIC BLOOD PRESSURE: 147 MMHG | OXYGEN SATURATION: 98 % | HEIGHT: 59 IN | RESPIRATION RATE: 18 BRPM | DIASTOLIC BLOOD PRESSURE: 81 MMHG | WEIGHT: 110.01 LBS

## 2021-08-04 DIAGNOSIS — Z90.49 ACQUIRED ABSENCE OF OTHER SPECIFIED PARTS OF DIGESTIVE TRACT: Chronic | ICD-10-CM

## 2021-08-04 PROCEDURE — 99283 EMERGENCY DEPT VISIT LOW MDM: CPT

## 2021-08-04 RX ORDER — KETOTIFEN FUMARATE 0.34 MG/ML
1 SOLUTION OPHTHALMIC DAILY
Refills: 0 | Status: DISCONTINUED | OUTPATIENT
Start: 2021-08-04 | End: 2021-08-09

## 2021-08-04 RX ORDER — LORATADINE 10 MG/1
10 TABLET ORAL ONCE
Refills: 0 | Status: COMPLETED | OUTPATIENT
Start: 2021-08-04 | End: 2021-08-04

## 2021-08-04 RX ADMIN — KETOTIFEN FUMARATE 1 DROP(S): 0.34 SOLUTION OPHTHALMIC at 22:39

## 2021-08-04 RX ADMIN — LORATADINE 10 MILLIGRAM(S): 10 TABLET ORAL at 22:39

## 2021-08-04 NOTE — ED PROVIDER NOTE - PATIENT PORTAL LINK FT
You can access the FollowMyHealth Patient Portal offered by [facility] by registering at the following website: [email or web address]. By joining [employer]’s FollowMyHealth portal, you will also be able to view your health information using other applications (apps) compatible with our system.

## 2021-08-04 NOTE — ED PROVIDER NOTE - NSFOLLOWUPINSTRUCTIONS_ED_ALL_ED_FT
Continue to apply eye drops 1 drop daily for the next 7-10 days   Follow up With ophthalmologist if symptoms persist   Follow up with PCP within 1-2 days     Return if new or worsening symptoms       General management of allergic conjunctivitis includes instructions to not rub the eyes, to discontinue the use of contact lenses during symptomatic periods, to apply cool compresses, and to liberally use refrigerated artificial tears throughout the day. (See 'Initial measures' above.)    ?Allergen avoidance measures are important in all forms of allergic conjunctivitis. These are reviewed separately. (See "Allergen avoidance in the treatment of asthma and allergic rhinitis".)    ?For patients requiring short-term treatment (ie, two weeks or less) to reduce symptoms, over-the-counter topical antihistamine/vasoconstrictor preparations may be adequate. The vasoconstrictor component reduces redness and conjunctival edema but can cause rebound hyperemia beyond two weeks of use. Examples include naphazoline hydrochloride/pheniramine maleate (Naphcon-A, Opcon-A, Visine-A, and others [brand names]). Dosing is up to four times daily. (See 'Acute allergic conjunctivitis' above.)    ?For patients with frequent episodes (ie, occurring more than two days per month) and for those with seasonal or perennial allergic conjunctivitis, we recommend a topical antihistamine with mast cell-stabilizing properties in preference to other therapies (Grade 1A). Examples include olopatadine (Patanol, Pataday, Pazeo [brand names]), alcaftadine (Lastacaft [brand name]), bepotastine (Bepreve [brand name]), azelastine hydrochloride (Optivar [brand name]), cetirizine (Zerviate [brand name]) epinastine (Elestat [brand name]), and ketotifen fumarate (Ketotifen [brand name]) (table 1). Onset of action is within minutes for most drugs. At least two weeks of therapy should be allowed in order to assess the full efficacy of these agents. (See 'Frequent episodes' above and 'Seasonal and perennial allergic conjunctivitis' above.)    •For patients with seasonal allergic conjunctivitis (SAC), treatment should be initiated at least two to four weeks before the pollen season to optimize effectiveness. (See 'Seasonal and perennial allergic conjunctivitis' above.)    •In patients with concomitant allergic rhinitis, the addition of an intranasal glucocorticoid spray is more effective than a nonsedating oral antihistamine. (See 'Patients with concomitant rhinitis' above.)    •In patients who also have dry eyes, oral antihistamines can worsen dry eye symptoms and should be avoided. (See 'Oral antihistamines' above.)    ?Patients with severe ocular symptoms that persist despite at least three weeks of antihistamines/mast cell-stabilizer therapy should be referred to an ophthalmologist for confirmation of the diagnosis, evaluation for concomitant dry eye, and further management. A short course of a topical "soft" steroid preparation may be required to control symptoms. (See 'Refractory symptoms' above and 'Glucocorticoids' above.)    ?Patients who wish to minimize use of medications, cannot adequately avoid the culprit allergen(s), or have severe and recurrent symptoms despite appropriate medications, should be referred to an allergy specialist. Allergen immunotherapy may be appropriate for such patients. (See 'Referral' above.)

## 2021-08-04 NOTE — ED PROVIDER NOTE - OBJECTIVE STATEMENT
56 year old female with pmhx of htn, hypothyroid presents c/o eye pain. Pt states she has had eye pain, redness, swelling x 10 days. she reported to urgent care for same symptoms. was prescribed topical erythromycin and steroids. Pt was told to follow up with eye doctor and has not. She admits to eyelid swelling, eyelid redness. Admits to recently using a new mascara and a machine that heats up to straighten her eyelashes that she states came "too close to her eyelids". denies pain worse on movement, eye discharge, allergies, eye redness, vision changes, new facial creams, detergents. + contacts

## 2021-08-04 NOTE — ED PROVIDER NOTE - CARE PROVIDER_API CALL
Yon Sosa (MD; PhD)  Ophthalmology  [address]  [address]  [place] [address]  Phone: [phone]  Fax: [phone]  Follow Up Time:

## 2021-09-30 ENCOUNTER — EMERGENCY (EMERGENCY)
Facility: HOSPITAL | Age: 56
LOS: 1 days | Discharge: HOME | End: 2021-09-30
Attending: EMERGENCY MEDICINE
Payer: COMMERCIAL

## 2021-09-30 VITALS
TEMPERATURE: 98 F | OXYGEN SATURATION: 98 % | HEART RATE: 82 BPM | HEIGHT: 59 IN | WEIGHT: 147.93 LBS | DIASTOLIC BLOOD PRESSURE: 70 MMHG | SYSTOLIC BLOOD PRESSURE: 149 MMHG | RESPIRATION RATE: 19 BRPM

## 2021-09-30 VITALS
OXYGEN SATURATION: 96 % | RESPIRATION RATE: 16 BRPM | DIASTOLIC BLOOD PRESSURE: 71 MMHG | SYSTOLIC BLOOD PRESSURE: 115 MMHG

## 2021-09-30 DIAGNOSIS — Z90.49 ACQUIRED ABSENCE OF OTHER SPECIFIED PARTS OF DIGESTIVE TRACT: Chronic | ICD-10-CM

## 2021-09-30 LAB
ALBUMIN SERPL ELPH-MCNC: 4.4 G/DL — SIGNIFICANT CHANGE UP (ref 3.3–5.2)
ALP SERPL-CCNC: 48 U/L — SIGNIFICANT CHANGE UP (ref 40–120)
ALT FLD-CCNC: 16 U/L — SIGNIFICANT CHANGE UP
ANION GAP SERPL CALC-SCNC: 13 MMOL/L — SIGNIFICANT CHANGE UP (ref 5–17)
APPEARANCE UR: CLEAR — SIGNIFICANT CHANGE UP
APTT BLD: 39.4 SEC — HIGH (ref 27.5–35.5)
AST SERPL-CCNC: 20 U/L — SIGNIFICANT CHANGE UP
BACTERIA # UR AUTO: NEGATIVE — SIGNIFICANT CHANGE UP
BASOPHILS # BLD AUTO: 0.02 K/UL — SIGNIFICANT CHANGE UP (ref 0–0.2)
BASOPHILS NFR BLD AUTO: 0.4 % — SIGNIFICANT CHANGE UP (ref 0–2)
BILIRUB SERPL-MCNC: 0.2 MG/DL — LOW (ref 0.4–2)
BILIRUB UR-MCNC: NEGATIVE — SIGNIFICANT CHANGE UP
BLD GP AB SCN SERPL QL: SIGNIFICANT CHANGE UP
BUN SERPL-MCNC: 13.5 MG/DL — SIGNIFICANT CHANGE UP (ref 8–20)
CALCIUM SERPL-MCNC: 9.2 MG/DL — SIGNIFICANT CHANGE UP (ref 8.6–10.2)
CHLORIDE SERPL-SCNC: 105 MMOL/L — SIGNIFICANT CHANGE UP (ref 98–107)
CO2 SERPL-SCNC: 25 MMOL/L — SIGNIFICANT CHANGE UP (ref 22–29)
COLOR SPEC: YELLOW — SIGNIFICANT CHANGE UP
CREAT SERPL-MCNC: 0.64 MG/DL — SIGNIFICANT CHANGE UP (ref 0.5–1.3)
DIFF PNL FLD: NEGATIVE — SIGNIFICANT CHANGE UP
EOSINOPHIL # BLD AUTO: 0.05 K/UL — SIGNIFICANT CHANGE UP (ref 0–0.5)
EOSINOPHIL NFR BLD AUTO: 1.1 % — SIGNIFICANT CHANGE UP (ref 0–6)
EPI CELLS # UR: SIGNIFICANT CHANGE UP
GLUCOSE SERPL-MCNC: 90 MG/DL — SIGNIFICANT CHANGE UP (ref 70–99)
GLUCOSE UR QL: NEGATIVE MG/DL — SIGNIFICANT CHANGE UP
HCT VFR BLD CALC: 37.6 % — SIGNIFICANT CHANGE UP (ref 34.5–45)
HGB BLD-MCNC: 12.6 G/DL — SIGNIFICANT CHANGE UP (ref 11.5–15.5)
IMM GRANULOCYTES NFR BLD AUTO: 0 % — SIGNIFICANT CHANGE UP (ref 0–1.5)
INR BLD: 1 RATIO — SIGNIFICANT CHANGE UP (ref 0.88–1.16)
KETONES UR-MCNC: NEGATIVE — SIGNIFICANT CHANGE UP
LEUKOCYTE ESTERASE UR-ACNC: NEGATIVE — SIGNIFICANT CHANGE UP
LIDOCAIN IGE QN: 37 U/L — SIGNIFICANT CHANGE UP (ref 22–51)
LYMPHOCYTES # BLD AUTO: 1.27 K/UL — SIGNIFICANT CHANGE UP (ref 1–3.3)
LYMPHOCYTES # BLD AUTO: 28.3 % — SIGNIFICANT CHANGE UP (ref 13–44)
MCHC RBC-ENTMCNC: 32.2 PG — SIGNIFICANT CHANGE UP (ref 27–34)
MCHC RBC-ENTMCNC: 33.5 GM/DL — SIGNIFICANT CHANGE UP (ref 32–36)
MCV RBC AUTO: 96.2 FL — SIGNIFICANT CHANGE UP (ref 80–100)
MONOCYTES # BLD AUTO: 0.44 K/UL — SIGNIFICANT CHANGE UP (ref 0–0.9)
MONOCYTES NFR BLD AUTO: 9.8 % — SIGNIFICANT CHANGE UP (ref 2–14)
NEUTROPHILS # BLD AUTO: 2.7 K/UL — SIGNIFICANT CHANGE UP (ref 1.8–7.4)
NEUTROPHILS NFR BLD AUTO: 60.4 % — SIGNIFICANT CHANGE UP (ref 43–77)
NITRITE UR-MCNC: NEGATIVE — SIGNIFICANT CHANGE UP
OB PNL STL: NEGATIVE — SIGNIFICANT CHANGE UP
PH UR: 7 — SIGNIFICANT CHANGE UP (ref 5–8)
PLATELET # BLD AUTO: 138 K/UL — LOW (ref 150–400)
POTASSIUM SERPL-MCNC: 4.3 MMOL/L — SIGNIFICANT CHANGE UP (ref 3.5–5.3)
POTASSIUM SERPL-SCNC: 4.3 MMOL/L — SIGNIFICANT CHANGE UP (ref 3.5–5.3)
PROT SERPL-MCNC: 6.4 G/DL — LOW (ref 6.6–8.7)
PROT UR-MCNC: 15 MG/DL
PROTHROM AB SERPL-ACNC: 11.6 SEC — SIGNIFICANT CHANGE UP (ref 10.6–13.6)
RBC # BLD: 3.91 M/UL — SIGNIFICANT CHANGE UP (ref 3.8–5.2)
RBC # FLD: 11.8 % — SIGNIFICANT CHANGE UP (ref 10.3–14.5)
RBC CASTS # UR COMP ASSIST: NEGATIVE /HPF — SIGNIFICANT CHANGE UP (ref 0–4)
SODIUM SERPL-SCNC: 143 MMOL/L — SIGNIFICANT CHANGE UP (ref 135–145)
SP GR SPEC: 1.01 — SIGNIFICANT CHANGE UP (ref 1.01–1.02)
TROPONIN T SERPL-MCNC: <0.01 NG/ML — SIGNIFICANT CHANGE UP (ref 0–0.06)
UROBILINOGEN FLD QL: NEGATIVE MG/DL — SIGNIFICANT CHANGE UP
WBC # BLD: 4.48 K/UL — SIGNIFICANT CHANGE UP (ref 3.8–10.5)
WBC # FLD AUTO: 4.48 K/UL — SIGNIFICANT CHANGE UP (ref 3.8–10.5)
WBC UR QL: NEGATIVE — SIGNIFICANT CHANGE UP

## 2021-09-30 PROCEDURE — 85730 THROMBOPLASTIN TIME PARTIAL: CPT

## 2021-09-30 PROCEDURE — G1004: CPT

## 2021-09-30 PROCEDURE — 85610 PROTHROMBIN TIME: CPT

## 2021-09-30 PROCEDURE — 74177 CT ABD & PELVIS W/CONTRAST: CPT | Mod: 26,MG

## 2021-09-30 PROCEDURE — 84484 ASSAY OF TROPONIN QUANT: CPT

## 2021-09-30 PROCEDURE — 81001 URINALYSIS AUTO W/SCOPE: CPT

## 2021-09-30 PROCEDURE — 80053 COMPREHEN METABOLIC PANEL: CPT

## 2021-09-30 PROCEDURE — 85025 COMPLETE CBC W/AUTO DIFF WBC: CPT

## 2021-09-30 PROCEDURE — 86901 BLOOD TYPING SEROLOGIC RH(D): CPT

## 2021-09-30 PROCEDURE — 82272 OCCULT BLD FECES 1-3 TESTS: CPT

## 2021-09-30 PROCEDURE — 93010 ELECTROCARDIOGRAM REPORT: CPT

## 2021-09-30 PROCEDURE — 99284 EMERGENCY DEPT VISIT MOD MDM: CPT | Mod: 25

## 2021-09-30 PROCEDURE — 36415 COLL VENOUS BLD VENIPUNCTURE: CPT

## 2021-09-30 PROCEDURE — 74177 CT ABD & PELVIS W/CONTRAST: CPT | Mod: MG

## 2021-09-30 PROCEDURE — 87086 URINE CULTURE/COLONY COUNT: CPT

## 2021-09-30 PROCEDURE — 86850 RBC ANTIBODY SCREEN: CPT

## 2021-09-30 PROCEDURE — 93005 ELECTROCARDIOGRAM TRACING: CPT

## 2021-09-30 PROCEDURE — 86900 BLOOD TYPING SEROLOGIC ABO: CPT

## 2021-09-30 PROCEDURE — 99285 EMERGENCY DEPT VISIT HI MDM: CPT

## 2021-09-30 PROCEDURE — 83690 ASSAY OF LIPASE: CPT

## 2021-09-30 RX ORDER — ACETAMINOPHEN 500 MG
2 TABLET ORAL
Qty: 40 | Refills: 0
Start: 2021-09-30 | End: 2021-10-04

## 2021-09-30 RX ORDER — IOHEXOL 300 MG/ML
30 INJECTION, SOLUTION INTRAVENOUS ONCE
Refills: 0 | Status: COMPLETED | OUTPATIENT
Start: 2021-09-30 | End: 2021-09-30

## 2021-09-30 RX ADMIN — IOHEXOL 30 MILLILITER(S): 300 INJECTION, SOLUTION INTRAVENOUS at 13:31

## 2021-09-30 NOTE — ED STATDOCS - PATIENT PORTAL LINK FT
You can access the FollowMyHealth Patient Portal offered by [facility] by registering at the following website: [email or web address]. By joining [employer]’s FollowMyHealth portal, you will also be able to view your health information using other applications (apps) compatible with our system.

## 2021-09-30 NOTE — ED STATDOCS - PHYSICAL EXAMINATION
VITAL SIGNS: I have reviewed nursing notes and confirm.  CONSTITUTIONAL: Well-developed; well-nourished; in no acute distress.  SKIN: Skin exam is warm and dry, no acute rash.  HEAD: Normocephalic; atraumatic.  EYES: PERRL, EOM intact; conjunctiva and sclera clear.  ENT: No nasal discharge; airway clear. Throat clear.  NECK: Supple; non tender.    CARD: S1, S2 normal; Regular rate and rhythm.  RESP: No wheezes,  no rales or rhonchi.   ABD:  soft; non-distended; (+) R pelvic tenderness with a small palpable mass. Rectal exam: Normal yellow stool. Female Chaperone: Ky Quiñones  EXT: Normal ROM. No clubbing, cyanosis or edema.  NEURO: Alert, oriented. Grossly unremarkable. No focal deficits. no facial droop, moves all extremities,  normal gait   PSYCH: Cooperative, appropriate.

## 2021-09-30 NOTE — ED STATDOCS - CLINICAL SUMMARY MEDICAL DECISION MAKING FREE TEXT BOX
Pt with intermittent RLQ pain with intermittent bulging and occasional bloody stools. Possible hernia. Will get blood work and CT scan. Pt reports chest pain only with abdominal pain.

## 2021-09-30 NOTE — ED STATDOCS - TOBACCO USE
Unknown if ever smoked Bi-Rhombic Flap Text: The defect edges were debeveled with a #15 scalpel blade.  Given the location of the defect and the proximity to free margins a bi-rhombic flap was deemed most appropriate.  Using a sterile surgical marker, an appropriate rhombic flap was drawn incorporating the defect. The area thus outlined was incised deep to adipose tissue with a #15 scalpel blade.  The skin margins were undermined to an appropriate distance in all directions utilizing iris scissors.

## 2021-09-30 NOTE — ED STATDOCS - NSFOLLOWUPINSTRUCTIONS_ED_ALL_ED_FT
Abdominal Pain    Many things can cause abdominal pain. Many times, abdominal pain is not caused by a disease and will improve without treatment. Your health care provider will do a physical exam to determine if there is a dangerous cause of your pain; blood tests and imaging may help determine the cause of your pain. However, in many cases, no cause may be found and you may need further testing as an outpatient. Monitor your abdominal pain for any changes.     SEEK IMMEDIATE MEDICAL CARE IF YOU HAVE ANY OF THE FOLLOWING SYMPTOMS: worsening abdominal pain, uncontrollable vomiting, profuse diarrhea, inability to have bowel movements or pass gas, black or bloody stools, fever accompanying chest pain or back pain, or fainting. These symptoms may represent a serious problem that is an emergency. Do not wait to see if the symptoms will go away. Get medical help right away. Call 911 and do not drive yourself to the hospital.     Please follow up with your doctor within 24 hours  Please follow up with gastroenterology within 72 hours

## 2021-09-30 NOTE — ED STATDOCS - CARE PROVIDER_API CALL
Ivonne Hester (DO)  Gastroenterology  [address], [address]  [place] [address]  Phone: [phone]  Fax: [phone]  Follow Up Time:

## 2021-09-30 NOTE — ED STATDOCS - ATTENDING CONTRIBUTION TO CARE
I, Emre Langley, performed the initial face to face bedside interview with this patient regarding history of present illness, review of symptoms and relevant past medical, social and family history.  I completed an independent physical examination.  I was the initial provider who evaluated this patient. I have signed out the follow up of any pending tests (i.e. labs, radiological studies) to the ACP.  I have communicated the patient’s plan of care and disposition with the ACP.

## 2021-09-30 NOTE — ED STATDOCS - OBJECTIVE STATEMENT
57 y/o female with a PMHx of HTN, HLD, valvular heart disease, hypothyroidism, appendectomy, smoker, presents to the ED c/o RLQ pain with nausea [id number], worsening today. Pt reports intermittent bulging to the same region that she noticed today. Bulging is improved at this time. Pt states she feels the pain with BM. Reports diarrhea x3days, occasional bright red blood in her stool and rectal pain with BM for several months. Pt also reports chest pain only when she feels the abdominal pain. On Losartan and Plavix due to leaky valves. Denies coronary stents.

## 2021-09-30 NOTE — ED STATDOCS - PROGRESS NOTE DETAILS
CT results discussed including common bile duct findings. PT reports she has never had pain in the RUQ and only in the RLQ. Her abdomen is soft non tender. She is tolerating PO in the ED. Will d/c with pmd and GI follow up. Abdominal pain precautions discussed

## 2021-09-30 NOTE — ED ADULT TRIAGE NOTE - CHIEF COMPLAINT QUOTE
Pt. complaining of lump and pain to right suprapubic area x 3 months.  Pt. states pain has severely worsened today causing her to come to the ED.  Pt. denies redness or discharge to site.  Pt. not taking medication for pain

## 2021-09-30 NOTE — ED STATDOCS - NS ED ROS FT
Review of Systems  CONSTITUTIONAL - no  fever, no diaphoresis, no weight change  SKIN - no rash  HEMATOLOGIC - no bleeding, no bruising  EYES - no eye pain, no blurred vision  ENT - no change in hearing, no pain  RESPIRATORY - no shortness of breath, no cough  CARDIAC - (+) chest pain, no palpitations  GI - (+) abd pain, (+) nausea, no vomiting, (+) diarrhea, no constipation, (+) bloody stool  GENITO-URINARY - no discharge, no dysuria; no hematuria,   ENDO - no polydypsia, no polyurea, no heat/no cold intolerance  MUSCULOSKELETAL - no joint pain, no swelling, no redness  NEUROLOGIC - no weakness, no headache, no anesthesia, no paresthesias  PSYCH - no anxiety, non suicidal, non homicidal, no hallucination, no depression

## 2021-10-02 LAB
CULTURE RESULTS: SIGNIFICANT CHANGE UP
SPECIMEN SOURCE: SIGNIFICANT CHANGE UP

## 2021-10-13 ENCOUNTER — APPOINTMENT (OUTPATIENT)
Dept: PULMONOLOGY | Facility: CLINIC | Age: 56
End: 2021-10-13
Payer: COMMERCIAL

## 2021-10-13 ENCOUNTER — NON-APPOINTMENT (OUTPATIENT)
Age: 56
End: 2021-10-13

## 2021-10-13 VITALS
BODY MASS INDEX: 22.62 KG/M2 | SYSTOLIC BLOOD PRESSURE: 120 MMHG | WEIGHT: 112 LBS | DIASTOLIC BLOOD PRESSURE: 74 MMHG | HEART RATE: 86 BPM | OXYGEN SATURATION: 98 %

## 2021-10-13 DIAGNOSIS — Z72.0 TOBACCO USE: ICD-10-CM

## 2021-10-13 DIAGNOSIS — J84.115 RESPIRATORY BRONCHIOLITIS INTERSTITIAL LUNG DISEASE: ICD-10-CM

## 2021-10-13 DIAGNOSIS — R91.8 OTHER NONSPECIFIC ABNORMAL FINDING OF LUNG FIELD: ICD-10-CM

## 2021-10-13 PROCEDURE — 99406 BEHAV CHNG SMOKING 3-10 MIN: CPT

## 2021-10-13 PROCEDURE — 99214 OFFICE O/P EST MOD 30 MIN: CPT | Mod: 25

## 2021-10-13 RX ORDER — METHYLPREDNISOLONE 4 MG/1
4 TABLET ORAL
Qty: 1 | Refills: 0 | Status: DISCONTINUED | COMMUNITY
Start: 2021-04-28 | End: 2021-10-13

## 2021-10-13 NOTE — PROCEDURE
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

## 2021-10-13 NOTE — DISCUSSION/SUMMARY
[FreeTextEntry1] : Vaping /nicotine  addiction/ post Covid April by hx\par Atypical chest pain, seen by cardiology in past 11/20 for similar symptoms, felt to be non cardiac\par Has  mild decreased EF on 10/20\par CT 5/21: faint GG centrilobular nodules no change from 11/20 more c/w RB-ILD\par PFTs were  normal 1/21 , prn Proair for hx asthma\par Vaping cessation discussed in detail, discussed causative role in RB-ILD\par Long discussion with pt, although symptoms atypical advised ER/cardiology follow up\par She wishes to see GI first\par Will obtain d dimer- suspicion low and repeat CT scan\par Importance of Covid vaccine reviewed in detail\par 4 months or sooner if needed, will call with results, prognosis guarded\par

## 2021-10-13 NOTE — HISTORY OF PRESENT ILLNESS
[TextBox_4] : 56 yo female\par smoker x many yrs\par still  vaping, nicotine menthol- but not as much\par no dyspnea \par no sputum\par last  PFts , are normal\par has chest pain with stress, more frequent\par

## 2021-10-13 NOTE — CONSULT LETTER
[Dear  ___] : Dear  [unfilled], [Consult Letter:] : I had the pleasure of evaluating your patient, [unfilled]. [Please see my note below.] : Please see my note below. [Sincerely,] : Sincerely, [FreeTextEntry3] : [facility] Nielsen DO [facility]\par Pulmonary Critical Care\par Director Pulmonary Division\par Medical Director Respiratory Therapy\par [facility]\par \par

## 2021-10-13 NOTE — REASON FOR VISIT
[Follow-Up] : a follow-up visit [Abnormal CXR/ Chest CT] : an abnormal CXR/ chest CT [Chest Pain] : chest pain [Nicotine Dependence] : nicotine dependence

## 2021-10-26 ENCOUNTER — NON-APPOINTMENT (OUTPATIENT)
Age: 56
End: 2021-10-26

## 2021-11-08 ENCOUNTER — APPOINTMENT (OUTPATIENT)
Dept: CARDIOLOGY | Facility: CLINIC | Age: 56
End: 2021-11-08
Payer: COMMERCIAL

## 2021-11-08 VITALS
OXYGEN SATURATION: 97 % | WEIGHT: 116 LBS | TEMPERATURE: 97.7 F | HEART RATE: 75 BPM | HEIGHT: 59 IN | DIASTOLIC BLOOD PRESSURE: 66 MMHG | SYSTOLIC BLOOD PRESSURE: 114 MMHG | BODY MASS INDEX: 23.39 KG/M2

## 2021-11-08 DIAGNOSIS — Z86.39 PERSONAL HISTORY OF OTHER ENDOCRINE, NUTRITIONAL AND METABOLIC DISEASE: ICD-10-CM

## 2021-11-08 DIAGNOSIS — G45.9 TRANSIENT CEREBRAL ISCHEMIC ATTACK, UNSPECIFIED: ICD-10-CM

## 2021-11-08 PROCEDURE — 93000 ELECTROCARDIOGRAM COMPLETE: CPT

## 2021-11-08 PROCEDURE — 99214 OFFICE O/P EST MOD 30 MIN: CPT

## 2021-11-08 RX ORDER — OMEPRAZOLE 40 MG/1
40 CAPSULE, DELAYED RELEASE ORAL
Refills: 0 | Status: DISCONTINUED | COMMUNITY
End: 2021-11-08

## 2021-11-13 PROBLEM — G45.9 TIA (TRANSIENT ISCHEMIC ATTACK): Status: ACTIVE | Noted: 2019-08-29

## 2021-11-13 PROBLEM — Z86.39 HISTORY OF HYPERLIPIDEMIA: Status: ACTIVE | Noted: 2019-03-07

## 2021-11-13 NOTE — ASSESSMENT
[FreeTextEntry1] : Patient is followed by pulmonologist, Dr. Nielsen.\par Blood pressure is to goal.  No more TIA.  Low-dose aspirin.\par Continue with a statin therapy.\par Patient will have a physical in the next few weeks and forward the blood tests for us to review.\par Nonobstructive CAD on cardiac catheterization about 2 years ago, atypical chest pain off-and-on cardiac etiology.\par Advised patient to follow-up with GI and get an endoscopy.\par patient was advised to see us in 6 months if stable and certainly earlier with any new symptoms.\par Patient was advised to partake in 150 minutes of moderate exercise per week.\par Repeat TTE with decrease in lv function and ai. \par

## 2021-11-13 NOTE — ASSESSMENT
[FreeTextEntry1] : Patient is followed by pulmonologist, Dr. Neilsen.\par Blood pressure is to goal.  No more TIA.  Low-dose aspirin.\par Continue with a statin therapy.\par Patient will have a physical in the next few weeks and forward the blood tests for us to review.\par Nonobstructive CAD on cardiac catheterization about 2 years ago, atypical chest pain off-and-on cardiac etiology.\par Advised patient to follow-up with GI and get an endoscopy.\par patient was advised to see us in 6 months if stable and certainly earlier with any new symptoms.\par Patient was advised to partake in 150 minutes of moderate exercise per week.\par Repeat TTE with decrease in lv function and ai. \par

## 2021-11-13 NOTE — HISTORY OF PRESENT ILLNESS
[FreeTextEntry1] : 56-year-old female who is seen today with hypertension, TIA, noncardiac chest discomfort.\par She has a history of moderate to severe aortic valve insufficiency.  Patient is a status post cardiac cath with normal coronary arteries in 2019.\par She also had a TIA in 2019 MRI was negative for CVA.\par Status post Covid infection April 2020.\par Does not exercise but is active daily.  No syncope or presyncope.\par she has difficulty falling sleep\par no more tia\par has not seen GI yet\par Needs to repeat TTE. \par \par EKG with normal sinus rhythm, normal axis and intervals, LVH by voltage criteria and nonspecific ST-T changes.\par \par Echocardiogram performed on October 9, 2020 showed mild decrease in LV systolic function, LVEF of 45 to 50%, sclerotic aortic valve with normal opening and only mild mitral valve regurgitation.

## 2021-11-13 NOTE — PHYSICAL EXAM
[Well Developed] : well developed [Normal Conjunctiva] : normal conjunctiva [No Carotid Bruit] : no carotid bruit [Normal S1, S2] : normal S1, S2 [Clear Lung Fields] : clear lung fields [Good Air Entry] : good air entry [Soft] : abdomen soft [Non Tender] : non-tender [de-identified] : 3 out of 6 diastolic murmur left sternal border

## 2021-11-13 NOTE — PHYSICAL EXAM
[Well Developed] : well developed [Normal Conjunctiva] : normal conjunctiva [No Carotid Bruit] : no carotid bruit [Normal S1, S2] : normal S1, S2 [Clear Lung Fields] : clear lung fields [Good Air Entry] : good air entry [Soft] : abdomen soft [Non Tender] : non-tender [de-identified] : 3 out of 6 diastolic murmur left sternal border

## 2021-11-13 NOTE — REVIEW OF SYSTEMS
[Weight Gain (___ Lbs)] : no recent weight gain [Feeling Fatigued] : not feeling fatigued [Weight Loss (___ Lbs)] : no recent weight loss [Seeing Double (Diplopia)] : no diplopia [Discharge From Ears] : no discharge from the ears [Sinus Pressure] : no sinus pressure [Dyspnea on exertion] : dyspnea during exertion [Chest Discomfort] : chest discomfort [Palpitations] : no palpitations [Cough] : no cough [Wheezing] : no wheezing [Abdominal Pain] : abdominal pain [Change in Appetite] : no change in appetite [Change In The Stool] : no change in stool [Joint Pain] : joint pain [Rash] : no rash

## 2021-11-23 ENCOUNTER — APPOINTMENT (OUTPATIENT)
Dept: CARDIOLOGY | Facility: CLINIC | Age: 56
End: 2021-11-23

## 2021-12-01 ENCOUNTER — APPOINTMENT (OUTPATIENT)
Dept: CARDIOLOGY | Facility: CLINIC | Age: 56
End: 2021-12-01

## 2021-12-14 ENCOUNTER — APPOINTMENT (OUTPATIENT)
Dept: GASTROENTEROLOGY | Facility: CLINIC | Age: 56
End: 2021-12-14

## 2021-12-20 ENCOUNTER — APPOINTMENT (OUTPATIENT)
Dept: CARDIOLOGY | Facility: CLINIC | Age: 56
End: 2021-12-20
Payer: COMMERCIAL

## 2021-12-20 PROCEDURE — 93306 TTE W/DOPPLER COMPLETE: CPT

## 2022-02-10 ENCOUNTER — APPOINTMENT (OUTPATIENT)
Dept: PULMONOLOGY | Facility: CLINIC | Age: 57
End: 2022-02-10

## 2022-05-26 NOTE — ED ADULT TRIAGE NOTE - WEIGHT IN LBS
1900- Patient restless. C/o difficulty swallowing and difficulty lifting RUE.  2007- C/o pain 8/10 to neck. Administer Oxycodone 10/325 mg PO. Will continue to monitor.  2030- Notify Dr. Diaz concerning patient's complaints.   2100- Dr. Diaz at bedside assessing patient.   2107- Resting in chair at bedside. Reports pain 5/10. No distress observed.   2118- New orders given.  2134- Administer Dexamethasone 8 mg IV.   0300- Resting in chair at bedside. No complaints voiced or signs of acute distress noted.    147.9

## 2022-06-01 ENCOUNTER — APPOINTMENT (OUTPATIENT)
Dept: GASTROENTEROLOGY | Facility: CLINIC | Age: 57
End: 2022-06-01

## 2022-06-23 ENCOUNTER — APPOINTMENT (OUTPATIENT)
Dept: CARDIOLOGY | Facility: CLINIC | Age: 57
End: 2022-06-23

## 2022-06-23 VITALS
HEIGHT: 59 IN | TEMPERATURE: 97.5 F | BODY MASS INDEX: 22.58 KG/M2 | DIASTOLIC BLOOD PRESSURE: 80 MMHG | OXYGEN SATURATION: 99 % | HEART RATE: 84 BPM | SYSTOLIC BLOOD PRESSURE: 140 MMHG | WEIGHT: 112 LBS

## 2022-06-23 DIAGNOSIS — I10 ESSENTIAL (PRIMARY) HYPERTENSION: ICD-10-CM

## 2022-06-23 DIAGNOSIS — I35.1 NONRHEUMATIC AORTIC (VALVE) INSUFFICIENCY: ICD-10-CM

## 2022-06-23 PROCEDURE — 93000 ELECTROCARDIOGRAM COMPLETE: CPT

## 2022-06-23 PROCEDURE — 99214 OFFICE O/P EST MOD 30 MIN: CPT

## 2022-06-23 RX ORDER — CLOPIDOGREL BISULFATE 75 MG/1
75 TABLET, FILM COATED ORAL
Qty: 90 | Refills: 2 | Status: ACTIVE | COMMUNITY
Start: 1900-01-01 | End: 1900-01-01

## 2022-06-23 RX ORDER — AMLODIPINE BESYLATE 10 MG/1
10 TABLET ORAL DAILY
Qty: 90 | Refills: 2 | Status: ACTIVE | COMMUNITY
Start: 2019-03-07 | End: 1900-01-01

## 2022-06-23 RX ORDER — TRIAMCINOLONE ACETONIDE 1 MG/G
0.1 OINTMENT TOPICAL
Refills: 0 | Status: DISCONTINUED | COMMUNITY
End: 2022-06-23

## 2022-06-23 RX ORDER — ATORVASTATIN CALCIUM 40 MG/1
40 TABLET, FILM COATED ORAL
Qty: 90 | Refills: 1 | Status: ACTIVE | COMMUNITY
Start: 1900-01-01 | End: 1900-01-01

## 2022-06-23 RX ORDER — LOSARTAN POTASSIUM 100 MG/1
100 TABLET, FILM COATED ORAL
Qty: 90 | Refills: 1 | Status: ACTIVE | COMMUNITY
Start: 1900-01-01 | End: 1900-01-01

## 2022-07-19 ENCOUNTER — RX CHANGE (OUTPATIENT)
Age: 57
End: 2022-07-19

## 2022-08-15 ENCOUNTER — APPOINTMENT (OUTPATIENT)
Dept: GASTROENTEROLOGY | Facility: CLINIC | Age: 57
End: 2022-08-15

## 2022-08-15 VITALS
RESPIRATION RATE: 14 BRPM | HEART RATE: 71 BPM | OXYGEN SATURATION: 98 % | SYSTOLIC BLOOD PRESSURE: 110 MMHG | DIASTOLIC BLOOD PRESSURE: 62 MMHG | WEIGHT: 108 LBS | HEIGHT: 59 IN | BODY MASS INDEX: 21.77 KG/M2

## 2022-08-15 DIAGNOSIS — R14.0 ABDOMINAL DISTENSION (GASEOUS): ICD-10-CM

## 2022-08-15 DIAGNOSIS — K92.1 MELENA: ICD-10-CM

## 2022-08-15 PROCEDURE — 82272 OCCULT BLD FECES 1-3 TESTS: CPT

## 2022-08-15 PROCEDURE — 99204 OFFICE O/P NEW MOD 45 MIN: CPT

## 2022-08-15 RX ORDER — POLYETHYLENE GLYCOL-3350 AND ELECTROLYTES 236; 6.74; 5.86; 2.97; 22.74 G/274.31G; G/274.31G; G/274.31G; G/274.31G; G/274.31G
236 POWDER, FOR SOLUTION ORAL
Qty: 1 | Refills: 0 | Status: ACTIVE | COMMUNITY
Start: 2022-08-15 | End: 1900-01-01

## 2022-08-15 NOTE — REASON FOR VISIT
[Consultation] : a consultation visit [FreeTextEntry1] : rectal bleeding, constipation, personal h/o colon polyps

## 2022-08-15 NOTE — ASSESSMENT
[FreeTextEntry1] : Patient is a 57 year old female, with PMH of HTN, TIA (2019), hypothyroidism, asthma, mild to moderate AI, who presents for evaluation of rectal bleeding, constipation and intermittent dysphagia with solids and pills. \par \par Dysphagia and atypical chest pain. Patient already received clearance for EGD and colonoscopy from cardiology\par EGD to be scheduled to r/o gastritis, esophagitis, Boone's Esophagus, or PUD. Indication, risks and benefit of EGD discussed with patient in detail. All questions answered. Patient is medically optimized for EGD.\par \par Rectal bleeding, constipation, personal h/o colon polyps on colonoscopy about 15 years ago\par Colonoscopy to be scheduled. I have discussed the indications, risks and benefits of procedure with patient. Risks include, but not limited to, bleeding, perforation, infection, and reaction to anesthesia. Alternatives to colonoscopy discussed with patient. Patient was given the opportunity to ask questions, all questions were answered. The patient agrees to proceed with colonoscopy. Patient is medically optimized for colonoscopy. \par \par Gavilyte prep to be used. Bowel prep instructions discussed at length. \par \par Labs ordered prior to procedure

## 2022-08-15 NOTE — HISTORY OF PRESENT ILLNESS
[de-identified] : Patient is a 57 year old female, with PMH of HTN, TIA (2019), hypothyroidism, asthma, mild to moderate AI, who presents for evaluation of rectal bleeding, constipation and intermittent dysphagia with solids and pills. \par \par Pt states she has had chronic constipation x 3-4 years. She was seen by Dr. Dennis in 2019 for atypical chest pain. She was advised to have an EGD and colonoscopy at that time but did not schedule. \par \par Pt is c/o intermittent dysphagia with solids and with taking pills. She denies heartburn or acid reflux. No nausea, vomiting. No h/o EGD in the past. \par \par Pt also c/o chronic constipation and rectal bleeding. She states she can go upwards of 1 week without a BM and typically has a BM without any intervention. Stools are soft and she denies straining. She does notice bright red blood mixed in with the stool, on the outside of the stool and on the toilet paper when she wipes. She had a colonoscopy at 42 y/o and had a polyp but did not follow up when she was advised to repeat. \par \par Pt is adopted and does not know her family history.\par \par Patient denies any significant cardiac or pulmonary conditions. \par \par Patient denies abdominal pain, nausea, vomiting, or unexplained weight loss.\par \par

## 2022-08-15 NOTE — PHYSICAL EXAM
[General Appearance - Alert] : alert [General Appearance - In No Acute Distress] : in no acute distress [Sclera] : the sclera and conjunctiva were normal [PERRL With Normal Accommodation] : pupils were equal in size, round, and reactive to light [Extraocular Movements] : extraocular movements were intact [Outer Ear] : the ears and nose were normal in appearance [Neck Appearance] : the appearance of the neck was normal [Auscultation Breath Sounds / Voice Sounds] : lungs were clear to auscultation bilaterally [Heart Rate And Rhythm] : heart rate was normal and rhythm regular [Heart Sounds] : normal S1 and S2 [Heart Sounds Gallop] : no gallops [Murmurs] : no murmurs [Heart Sounds Pericardial Friction Rub] : no pericardial rub [Bowel Sounds] : normal bowel sounds [Abdomen Soft] : soft [Abdomen Tenderness] : non-tender [] : no hepato-splenomegaly [Abdomen Mass (___ Cm)] : no abdominal mass palpated [Normal Sphincter Tone] : normal sphincter tone [No Rectal Mass] : no rectal mass [No Focal Deficits] : no focal deficits [Oriented To Time, Place, And Person] : oriented to person, place, and time [Impaired Insight] : insight and judgment were intact [Affect] : the affect was normal [External Hemorrhoid] : no external hemorrhoids [Occult Blood Positive] : stool was negative for occult blood

## 2022-09-07 ENCOUNTER — RX CHANGE (OUTPATIENT)
Age: 57
End: 2022-09-07

## 2022-09-27 NOTE — ED PROVIDER NOTE - ATTENDING WITH...
Neurology Progress Note    Patient ID:  Jeffrey Mathis  [id number]  40 y.o.  1985      CHIEF COMPLAINT: Numbness of the right leg    Subjective:      Patient is a 67-year-old right-handed female seen today on work in basis at the request of Dr. Анна Caruso for evaluation of new problem of patient having a 1 week history of numbness of her right leg goes from about the hip down to the foot, slightly worse distally as compared to proximally, but not associated with any clear weakness, pain, or back pain, or change in her bowel or bladder function or any left leg symptoms or right arm symptoms. It seemed to come on just 1 morning when she woke up, and has persisted since then not really gotten any better or worse, but does tend to fluctuate some but never goes away completely. She was worked in today for urgent evaluation to rule out flareup of her MS. She has been on the medication of Aixa Mcknight once a month injections since November or December last year. Her last MRI in November before she started medication showed stable but prominent white matter disease consistent with her diagnosis of MS. Other than that she is taking the medication and had no major side effects or injection site reactions. She has some visual blurring last November but had an MRI scan of the brain and cervical spine that was stable, showing no new lesions and no active MS activity at all on the new medication. We will have her get metabolic studies done and lab test to make sure she is stable on the medication and not having any hematologic or medical complications or ESPERANAZ virus infections on the medication. She has gotten disability. She was previously on [place]. Patient seen 6 months ago. We reviewed her MRI scans all personally on the PACS system and I agree with report as dictated and discussed that with the patient and her  in detail.   Her metabolic parameters and hematologic parameters were also checked to make sure that she is safe with the medication that have marked leukopenia. 3 years ago she has had an episode of optic neuritis in the left eye, and was given IV steroids with some improvement, and her visual acuity is back to 20/20 with handcard and near vision today. She is to get back on her medications including vitamin D and multivitamins and continue her muscle relaxers as needed  She has a visual acuity of 20/20, but has a subjective blurring and slight decreased color desaturation in the left eye as compared to the right at last visit. Patient's MRI 2018 with the last 1 did show new lesions, and she has been relatively stable on MRI scans since then. The patient had a previous workup in 2011 with abnormal spinal fluid suggesting demyelinating disease with elevated IgG synthesis rate, oligoclonal bands and myelin basic protein, and an abnormal MRI of the brain and cervical spine then, but she never returned for follow-up. She also had a previous episode in 2005 when I saw her 12 years ago, at that time her workup was negative for any signs of MS. She is better, and able to walk without assistive devices, and has gone back to work and no longer on disability. 35 minutes spent with the patient and her  in the office today, going over all the different medications, giving her information to read on them, advising her of the risks, benefits and side effects of all the different medications.          Past Medical History:   Diagnosis Date    37 weeks gestation of pregnancy 12/2/2020    Added automatically from request for surgery [id number]    Anemia affecting pregnancy 9/3/2020    Diagnosed 9/2/2020 : H&H 9.6/31 , Ferritin 13 Symptoms: none  If ferritin is <30, then:  --Second trimester Hgb <10.5 g/dL:  --IV iron  PLAN: - messaged pt about starting IV iron on 09/03/20 - patient scheduled at infusion center on 10/5/20 Dobby.Pastor Ramírez recheck CBC 11/ 2020 - Hgb 10.9, ferritin 365, discontinued oral iron due to high ferritin. Last Assessment & Plan:  11/12 Hgg 10.9, ferritin 365, disc    Asthma     Ectopic pregnancy 2/14/2019    Gastrointestinal disorder     reflux    GERD (gastroesophageal reflux disease)     Multiple sclerosis (HCC)     Multiple sclerosis affecting pregnancy ([facility] [address] 6/25/2020    Was taking Tecfidera before pregnancy, but has since stopped it. Only MS symptoms are vision loss in her left eye. Last attack was in 2017. --Discussed that pregnancy is associated with a significant decrease in MS disease activity and postpartum period is associated with an increase in MS activity. --Acute MS attacks (relapses) during pregnancy can be treated with intravenous glucocorticoids, w    Multiple sclerosis affecting pregnancy ([facility] [address] 6/25/2020    Was taking Tecfidera before pregnancy, but has since stopped it. Only MS symptoms are vision loss in her left eye. Last attack was in 2017. --Discussed that pregnancy is associated with a significant decrease in MS disease activity and postpartum period is associated with an increase in MS activity. --Acute MS attacks (relapses) during pregnancy can be treated with intravenous glucocorticoids, w    Post-op pain 2/14/2019    Supervision of normal pregnancy 6/25/2020    Dated by LMP Genetic screening: declined Immunization: TDaP at 28 weeks, influenza **, VZV immune (?) rubella immune Rhogam: Not indicated, patient O pos 1hr GTT at 28 weeks GBS at 35 weeks Breastfeeding: Plans to breast feed PP Contraception: need to address Total weight gain: 6 lb 9.6 oz (2.994 kg)  Needs urine NING. Supervision of normal pregnancy 6/25/2020    Dated by LMP Genetic screening: declined Immunization: TDaP at 28 weeks, influenza **, VZV immune (?) rubella immune Rhogam: Not indicated, patient O pos 1hr GTT at 28 weeks GBS at 35 weeks Breastfeeding: Plans to breast feed PP Contraception: need to address Total weight gain: 6 lb 9.6 oz (2.994 kg)  Needs urine NING.   Dated by LMP Genetic screening: declined Immunization: TDaP at 28 weeks 10/1, i    UTI (urinary tract infection) in pregnancy, antepartum 9/3/2020    Initial visit:E.coli UTI diagnosed at initial visit. Treated with Keflex. NING Urine Sample collected on 9/2 for culture to ensure clearance of UTI - UTI cleared 10/1: denies symptoms 10/12: Found to have pseudomonas UTI, treated with 2g of IM cefepime q12h for 6 doses. Completed on 10/18/2020. 10/29: Urine culture ordered for test of cure, still positive for Pseudomonas. 11/12: Patient s/p Fosfomy       Past Surgical History:   Procedure Laterality Date    HX GYN  10/2013    c section    HX HEENT      tonsils, adenoids, PE tubes       [unfilled]    Social History     Tobacco Use    Smoking status: Never    Smokeless tobacco: Never   Substance Use Topics    Alcohol use: Yes     Alcohol/week: 1.0 standard drink     Types: 1 Glasses of wine per week           Allergies   Allergen Reactions    Sulfa (Sulfonamide Antibiotics) Hives       Review of Systems:    A comprehensive review of systems was negative except for: Constitutional: positive for fatigue and malaise  Eyes: positive for visual disturbance  Neurological: positive for coordination problems, gait problems, weakness and Visual loss    Objective:      Objective:     @IPVITALS(24:)@      Lab Review No results found for this or any previous visit (from the past 24 hour(s)). Additional comments:I personally viewed and interpreted the patient's MRI scans reviewed personally on the PACS system today, and they were shown to the patient        NEUROLOGICAL EXAM:    Appearance: The patient is well developed, well nourished, provides a coherent history and is in no acute distress. Mental Status: Oriented to time, place and person and the president, cognitive function and fund of knowledge is normal. Speech is fluent without aphasia or dysarthria. Mood and affect appropriate. Cranial Nerves:   Intact visual fields.  Fundi are positive and they show optic pallor bilaterally left greater than right at last visit, No Lethia Clipper pupil present. Visual acuity is 20/20 in the left eye missing only 1 number with near vision, and 20/20 in the right eye at last visit. CLEMENTE, EOM's full, no nystagmus, no ptosis. Facial sensation is normal. Corneal reflexes are not tested. Facial movement is symmetric. Hearing is normal bilaterally. Palate is midline with normal sternocleidomastoid and trapezius muscles are normal. Tongue is midline. Neck without meningismus or bruits  Temporal arteries are not tender or enlarged    Motor:  4/5 strength in upper and lower proximal and distal muscles. Normal bulk and tone. No fasciculations. Patient has percussion tenderness over the lumbar spine, and tenderness over the paraspinal muscles which are tight and sore  Rapid alternating movement is a little slow but symmetric bilaterally   Reflexes:   Deep tendon reflexes 2+/4 and symmetrical in the upper extremities, and 3+/4 in the lower extremities bilaterally and  No babinski or clonus present    Sensory:   Normal to touch, pinprick and temperature and vibration and temperature and DSS is intact except for a slight decrease sensation of about 30 to 50% in the right leg. Gait:  Abnormal gait with patient moving slowly due to spastic mildly paraplegic gait with ataxia. Finger-nose-finger examination shows no major dysmetria   Tremor:   No tremor noted. Cerebellar:   Mildly abnormal Romberg and tandem cerebellar signs present.    Neurovascular:  Normal heart sounds and regular rhythm, peripheral pulses intact, and no carotid bruits         Assessment:        Assessment:       ICD-10-CM ICD-9-CM    1. Multiple sclerosis, relapsing-remitting (HCC)  G35 340 VIELKA COMPREHENSIVE PLUS PANEL      CK      SED RATE (ESR)      STRATIFY JCV (TM) B W/INDEX      IMMUNOELECTROPHORESIS (IMMUNOFIX.)      METABOLIC PANEL, COMPREHENSIVE      methylPREDNISolone (MEDROL DOSEPACK) 4 mg tablet      MRI BRAIN W WO CONT      MRI CERV SPINE W WO CONT      VIELKA COMPREHENSIVE PLUS PANEL      CK      SED RATE (ESR)      IMMUNOELECTROPHORESIS (IMMUNOFIX.)      METABOLIC PANEL, COMPREHENSIVE      tiZANidine (ZANAFLEX) 2 mg tablet      2. Left optic neuritis  H46.9 377.30 VIELKA COMPREHENSIVE PLUS PANEL      CK      SED RATE (ESR)      STRATIFY JCV (TM) B W/INDEX      IMMUNOELECTROPHORESIS (IMMUNOFIX.)      METABOLIC PANEL, COMPREHENSIVE      methylPREDNISolone (MEDROL DOSEPACK) 4 mg tablet      MRI BRAIN W WO CONT      MRI CERV SPINE W WO CONT      VIELKA COMPREHENSIVE PLUS PANEL      CK      SED RATE (ESR)      IMMUNOELECTROPHORESIS (IMMUNOFIX.)      METABOLIC PANEL, COMPREHENSIVE      tiZANidine (ZANAFLEX) 2 mg tablet      3. Multiple sclerosis exacerbation (HCC)  G35 340 VIELKA COMPREHENSIVE PLUS PANEL      CK      SED RATE (ESR)      STRATIFY JCV (TM) B W/INDEX      IMMUNOELECTROPHORESIS (IMMUNOFIX.)      METABOLIC PANEL, COMPREHENSIVE      methylPREDNISolone (MEDROL DOSEPACK) 4 mg tablet      MRI BRAIN W WO CONT      MRI CERV SPINE W WO CONT      VIELKA COMPREHENSIVE PLUS PANEL      CK      SED RATE (ESR)      IMMUNOELECTROPHORESIS (IMMUNOFIX.)      METABOLIC PANEL, COMPREHENSIVE      tiZANidine (ZANAFLEX) 2 mg tablet            Plan:     Patient seen on urgent work in basis for possible exacerbation of her MS, with numbness of the right leg, with about 30 to 50% decrease involving the whole right leg but somewhat patchy in different areas, but not associated with any clear objective weakness on exam or clinically as noted by the patient. Without any back pain, and no leg pain, this is likely to be a radiculopathy, also involves the whole leg which makes an MS or more central lesion likely.   We will check an MRI of the brain and cervical spine with and without contrast to see if there is any enhancing lesions, or evidence of PML, check a ESPERANZA virus index titer, and routine metabolic studies and immunoglobulins to follow her IgM levels. We will also give her a Medrol Dosepak to see if that helps her numbness recover more quickly. Patient on Theresa Bustle and doing well, and her last MRI scan done November 2021 looks stable, with no new disease of both the neck and cervical spine. She is tolerating the medication well without any side effects so far. Patient will get blood work done and that was sent to her, to rule out any hematologic or metabolic complications, and to rule out ESPERANZA virus. Patient also seen to evaluate her neurologic status which actually seems to be stable with her symptoms of mild ataxia and visual blurring probably just related to her old MS disease and fluctuations from fatigue and heat and not really an exacerbation. Patient also has new complaints of increasing memory difficulty, we explained this more like an MS fall the patient feels that way, and does not think she needs another evaluation, and the  seems understand the cognitive problems with MS. Patient also has problems with fatigue and lack of energy and we recommend that she try to stay physically mentally active take her vitamins and vitamin D but this was also very common problem in over 90% of the patients with MS as is the memory problems. Her last MRI scans of the brain and the spine in November 2021 showed no new lesions. She has had no relapses and no exacerbations on the medication so far. We will repeat her MRI scan since it is almost 1 year and now she is had this acute exacerbation. .  Patient has finished her physical therapy and try to exercise regularly and stay active. We discussed her condition with her and her family also today   38 minutes spent with the patient going over all of this with her in detail and making sure she is stable back at work and not having any new MS symptoms.   Follow-up in 3 weeks time or earlier if needed    Signed:  Blanca Quintana MD  9/27/2022  8:51 PM    Ginny Tineo MD  [phone] ACP

## 2022-10-11 ENCOUNTER — RX CHANGE (OUTPATIENT)
Age: 57
End: 2022-10-11

## 2022-11-03 ENCOUNTER — TRANSCRIPTION ENCOUNTER (OUTPATIENT)
Age: 57
End: 2022-11-03

## 2022-11-04 ENCOUNTER — APPOINTMENT (OUTPATIENT)
Dept: GASTROENTEROLOGY | Facility: GI CENTER | Age: 57
End: 2022-11-04

## 2022-11-04 ENCOUNTER — RESULT REVIEW (OUTPATIENT)
Age: 57
End: 2022-11-04

## 2022-11-04 ENCOUNTER — OUTPATIENT (OUTPATIENT)
Dept: OUTPATIENT SERVICES | Facility: HOSPITAL | Age: 57
LOS: 1 days | End: 2022-11-04
Payer: COMMERCIAL

## 2022-11-04 DIAGNOSIS — Z90.49 ACQUIRED ABSENCE OF OTHER SPECIFIED PARTS OF DIGESTIVE TRACT: Chronic | ICD-10-CM

## 2022-11-04 DIAGNOSIS — Z86.010 PERSONAL HISTORY OF COLONIC POLYPS: ICD-10-CM

## 2022-11-04 DIAGNOSIS — K62.5 HEMORRHAGE OF ANUS AND RECTUM: ICD-10-CM

## 2022-11-04 DIAGNOSIS — R14.0 ABDOMINAL DISTENSION (GASEOUS): ICD-10-CM

## 2022-11-04 DIAGNOSIS — K63.89 OTHER SPECIFIED DISEASES OF INTESTINE: ICD-10-CM

## 2022-11-04 DIAGNOSIS — K64.8 OTHER HEMORRHOIDS: ICD-10-CM

## 2022-11-04 PROCEDURE — 88342 IMHCHEM/IMCYTCHM 1ST ANTB: CPT | Mod: 26

## 2022-11-04 PROCEDURE — 45378 DIAGNOSTIC COLONOSCOPY: CPT | Mod: 33

## 2022-11-04 PROCEDURE — 88342 IMHCHEM/IMCYTCHM 1ST ANTB: CPT

## 2022-11-04 PROCEDURE — G0105: CPT

## 2022-11-04 PROCEDURE — 43239 EGD BIOPSY SINGLE/MULTIPLE: CPT | Mod: 59

## 2022-11-04 PROCEDURE — 88305 TISSUE EXAM BY PATHOLOGIST: CPT | Mod: 26

## 2022-11-04 PROCEDURE — 43239 EGD BIOPSY SINGLE/MULTIPLE: CPT

## 2022-11-04 PROCEDURE — 88305 TISSUE EXAM BY PATHOLOGIST: CPT

## 2022-11-04 RX ORDER — LEVOTHYROXINE SODIUM 0.17 MG/1
175 TABLET ORAL
Qty: 30 | Refills: 0 | Status: ACTIVE | COMMUNITY
Start: 2022-09-26

## 2022-11-04 RX ORDER — LOTEPREDNOL ETABONATE 2.5 MG/ML
0.25 SUSPENSION/ DROPS OPHTHALMIC
Qty: 8 | Refills: 0 | Status: ACTIVE | COMMUNITY
Start: 2022-07-19

## 2022-11-04 NOTE — ASSESSMENT
[FreeTextEntry1] : Cardiac clearance appreciated. Medically optimized for the proposed EGD and Colonoscopy.

## 2022-11-04 NOTE — REVIEW OF SYSTEMS
[Negative] : Heme/Lymph [Abdominal Pain] : no abdominal pain [Vomiting] : no vomiting [Constipation] : no constipation [Diarrhea] : no diarrhea [Heartburn] : no heartburn [Melena (black stool)] : no melena [Bleeding] : no bleeding [Fecal Incontinence (soiling)] : no fecal incontinence [Bloating (gassiness)] : no bloating [FreeTextEntry7] : atypical chest pain and dysphagia

## 2022-11-04 NOTE — PHYSICAL EXAM
[Alert] : alert [Normal Voice/Communication] : normal voice/communication [Healthy Appearing] : healthy appearing [No Acute Distress] : no acute distress [Well Developed] : well developed [Well Nourished] : well nourished [Sclera] : the sclera and conjunctiva were normal [Hearing Threshold Finger Rub Not Burke] : hearing was normal [Normal Lips/Gums] : the lips and gums were normal [Oropharynx] : the oropharynx was normal [Normal Appearance] : the appearance of the neck was normal [No Neck Mass] : no neck mass was observed [No Respiratory Distress] : no respiratory distress [No Acc Muscle Use] : no accessory muscle use [Respiration, Rhythm And Depth] : normal respiratory rhythm and effort [Auscultation Breath Sounds / Voice Sounds] : lungs were clear to auscultation bilaterally [Heart Rate And Rhythm] : heart rate was normal and rhythm regular [Normal S1, S2] : normal S1 and S2 [Murmurs] : no murmurs [None] : no edema [Bowel Sounds] : normal bowel sounds [Abdomen Tenderness] : non-tender [No Masses] : no abdominal mass palpated [Abdomen Soft] : soft [] : no hepatosplenomegaly [Oriented To Time, Place, And Person] : oriented to person, place, and time

## 2022-11-04 NOTE — REASON FOR VISIT
[Procedure: _________] : a [unfilled] procedure visit [FreeTextEntry1] : Dysphagia and atypical chest pain and h/o colon polyps

## 2022-11-08 LAB — SURGICAL PATHOLOGY STUDY: SIGNIFICANT CHANGE UP

## 2022-12-09 ENCOUNTER — APPOINTMENT (OUTPATIENT)
Dept: GASTROENTEROLOGY | Facility: CLINIC | Age: 57
End: 2022-12-09

## 2023-01-09 ENCOUNTER — LABORATORY RESULT (OUTPATIENT)
Age: 58
End: 2023-01-09

## 2023-01-09 ENCOUNTER — APPOINTMENT (OUTPATIENT)
Dept: PULMONOLOGY | Facility: CLINIC | Age: 58
End: 2023-01-09
Payer: COMMERCIAL

## 2023-01-09 VITALS
OXYGEN SATURATION: 98 % | DIASTOLIC BLOOD PRESSURE: 60 MMHG | SYSTOLIC BLOOD PRESSURE: 110 MMHG | RESPIRATION RATE: 16 BRPM | HEART RATE: 79 BPM

## 2023-01-09 DIAGNOSIS — R07.89 OTHER CHEST PAIN: ICD-10-CM

## 2023-01-09 DIAGNOSIS — Z00.00 ENCOUNTER FOR GENERAL ADULT MEDICAL EXAMINATION W/OUT ABNORMAL FINDINGS: ICD-10-CM

## 2023-01-09 PROCEDURE — 85018 HEMOGLOBIN: CPT | Mod: QW

## 2023-01-09 PROCEDURE — 99215 OFFICE O/P EST HI 40 MIN: CPT | Mod: 25

## 2023-01-09 PROCEDURE — 94727 GAS DIL/WSHOT DETER LNG VOL: CPT

## 2023-01-09 PROCEDURE — 94010 BREATHING CAPACITY TEST: CPT

## 2023-01-09 PROCEDURE — 94729 DIFFUSING CAPACITY: CPT

## 2023-01-09 RX ORDER — NON-ADHERENT BANDAGE 3"X4"
0.65 BANDAGE TOPICAL
Qty: 44 | Refills: 0 | Status: DISCONTINUED | COMMUNITY
Start: 2022-09-19 | End: 2023-01-09

## 2023-01-09 RX ORDER — POLYMYXIN B SULFATE AND TRIMETHOPRIM 10000; 1 [USP'U]/ML; MG/ML
10000-0.1 SOLUTION OPHTHALMIC
Qty: 10 | Refills: 0 | Status: DISCONTINUED | COMMUNITY
Start: 2022-10-31 | End: 2023-01-09

## 2023-01-09 RX ORDER — BUDESONIDE 0.5 MG/2ML
0.5 INHALANT ORAL
Qty: 60 | Refills: 0 | Status: DISCONTINUED | COMMUNITY
Start: 2022-09-26 | End: 2023-01-09

## 2023-01-09 RX ORDER — BENZONATATE 200 MG/1
200 CAPSULE ORAL
Qty: 30 | Refills: 0 | Status: DISCONTINUED | COMMUNITY
Start: 2022-07-30 | End: 2023-01-09

## 2023-01-09 RX ORDER — OMEPRAZOLE 40 MG/1
40 CAPSULE, DELAYED RELEASE ORAL
Qty: 30 | Refills: 5 | Status: DISCONTINUED | COMMUNITY
Start: 2022-11-04 | End: 2023-01-09

## 2023-01-09 RX ORDER — NITROFURANTOIN (MONOHYDRATE/MACROCRYSTALS) 25; 75 MG/1; MG/1
100 CAPSULE ORAL
Qty: 14 | Refills: 0 | Status: DISCONTINUED | COMMUNITY
Start: 2022-08-16 | End: 2023-01-09

## 2023-01-09 RX ORDER — SODIUM SULFATE, POTASSIUM SULFATE, MAGNESIUM SULFATE 17.5; 3.13; 1.6 G/ML; G/ML; G/ML
17.5-3.13-1.6 SOLUTION, CONCENTRATE ORAL
Qty: 1 | Refills: 0 | Status: DISCONTINUED | COMMUNITY
Start: 2022-08-15 | End: 2023-01-09

## 2023-01-09 RX ORDER — OFLOXACIN 3 MG/ML
0.3 SOLUTION/ DROPS OPHTHALMIC
Qty: 5 | Refills: 0 | Status: DISCONTINUED | COMMUNITY
Start: 2022-10-14 | End: 2023-01-09

## 2023-01-09 RX ORDER — CIPROFLOXACIN HYDROCHLORIDE 500 MG/1
500 TABLET, FILM COATED ORAL
Qty: 14 | Refills: 0 | Status: DISCONTINUED | COMMUNITY
Start: 2022-08-08 | End: 2023-01-09

## 2023-01-09 NOTE — REVIEW OF SYSTEMS
[Chest Discomfort] : chest discomfort [Chronic Pain] : chronic pain [Blood Transfusion] : blood transfusion [Fever] : no fever [Recent Wt Gain (___ Lbs)] : ~T no recent weight gain [Chills] : no chills [Recent Wt Loss (___ Lbs)] : ~T no recent weight loss [Cough] : no cough [Hemoptysis] : no hemoptysis [Chest Tightness] : no chest tightness [Dyspnea] : no dyspnea [Wheezing] : no wheezing [SOB on Exertion] : no sob on exertion [GERD] : no gerd [Abdominal Pain] : no abdominal pain [Nausea] : no nausea [Vomiting] : no vomiting [Dysphagia] : no dysphagia [Bleeding] : no bleeding [Rash] : no rash [Clotting Disorder/ Frequent bleeding] : no clotting disorder/ frequent bleeding [Diabetes] : no diabetes [Thyroid Problem] : no thyroid problem [Obesity] : no obesity [TextBox_69] : h/o   vomiting in past  [TextBox_94] : pain muscles arthritis

## 2023-01-09 NOTE — ASSESSMENT
[FreeTextEntry1] : 56y/o female\par \par \par 1-  ex smoker > 20 pack year   /  current vaping  now  with right  pleuritic pain \par 2-  asthma    \par 3-  ct 2021  upper lobe reticulonodular changes  ground glass  ? ILD/ RB vs other cause / 2019 ?liver lesion\par 4-  two dogs/  adopted \par 5- pre-operative clearance  right hand surgery\par 6- ECHO  moderate  AI\par 7- vaccinations - per  primary \par \par Recommendations\par 1- duonebs q 4-  8 hour prn \par 2- cxr\par 3- ct\par 4- asthma  profile    cvd work up    d-dimer QuantiFERON-TB  \par 5- encouraged to quit vaping\par 6-   bone density \par \par \par will clear for surgery after above  has cardiology follow up \par \par patient agrees   f/u two weeks \par \par

## 2023-01-09 NOTE — PROCEDURE
[FreeTextEntry1] : PFT 1/9/23     FVC  normal  FEV1  normal  ratio  78 normal for age   normal TLC normal dlco

## 2023-01-09 NOTE — REASON FOR VISIT
[Follow-Up] : a follow-up visit [Abnormal CXR/ Chest CT] : an abnormal CXR/ chest CT [Emphysema] : emphysema [Pre-op Risk Stratification] : pre-op risk stratification

## 2023-01-09 NOTE — PHYSICAL EXAM
[Supple] : supple [No Neck Mass] : no neck mass [No JVD] : no jvd [Normal S1, S2] : normal s1, s2 [Clear to Auscultation Bilaterally] : clear to auscultation bilaterally [Benign] : benign [No Masses] : no masses [Soft] : soft [No Hernias] : no hernias [Normal Bowel Sounds] : normal bowel sounds [Normal Gait] : normal gait [No Clubbing] : no clubbing [No Edema] : no edema [No Rash] : no rash [No Motor Deficits] : no motor deficits [Normal Affect] : normal affect [TextBox_2] : pleasant f no distress speaking full sentences no cough  [TextBox_11] : moist  crowded no lesions

## 2023-01-09 NOTE — HISTORY OF PRESENT ILLNESS
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\par PFTs were normal 1/21 , prn Proair for hx asthma\par Vaping cessation discussed in detail, discussed causative role in RB-ILD\par \par \par 1/9/23\par 58y/o  female   born in [place]   ex smoker ( 18-  53y/o  quit 2020-  one pack day  26pack years)   vaping now  not ready to quit -  h/o  vavlular heart disease -  echo 2021  moderate AI,   TIA on plavix, -  covid   2020 -   3.5 months   sick -   bad resp symptoms  home only -  work hx:  stop and shop now for  pre-operative respiratory  clearance right   hand for   tendon issues \par \par - 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 eitology  on plavix  \par \par \par  [TextBox_11] : 1 [TextBox_13] : 26 [YearQuit] : 2020

## 2023-01-11 LAB
A ALTERNATA IGE QN: <0.1 KUA/L
A FUMIGATUS IGE QN: <0.1 KUA/L
BARLEY IGE QN: <0.1 KUA/L
BASOPHILS # BLD AUTO: 0.03 K/UL
BASOPHILS NFR BLD AUTO: 0.7 %
C ALBICANS IGE QN: 0.15 KUA/L
C HERBARUM IGE QN: <0.1 KUA/L
CAT DANDER IGE QN: <0.1 KUA/L
CELIACPAN: NORMAL
CHERRY IGE QN: <0.1 KUA/L
COMMON RAGWEED IGE QN: <0.1 KUA/L
COW MILK IGE QN: <0.1 KUA/L
CRAB IGE QN: <0.1 KUA/L
D FARINAE IGE QN: 0.19 KUA/L
D PTERONYSS IGE QN: 0.21 KUA/L
DEPRECATED A ALTERNATA IGE RAST QL: 0
DEPRECATED A FUMIGATUS IGE RAST QL: 0
DEPRECATED BARLEY IGE RAST QL: 0
DEPRECATED C ALBICANS IGE RAST QL: NORMAL
DEPRECATED C HERBARUM IGE RAST QL: 0
DEPRECATED CAT DANDER IGE RAST QL: 0
DEPRECATED CHERRY IGE RAST QL: 0
DEPRECATED COMMON RAGWEED IGE RAST QL: 0
DEPRECATED COW MILK IGE RAST QL: 0
DEPRECATED CRAB IGE RAST QL: 0
DEPRECATED D DIMER PPP IA-ACNC: <150 NG/ML DDU
DEPRECATED D FARINAE IGE RAST QL: NORMAL
DEPRECATED D PTERONYSS IGE RAST QL: NORMAL
DEPRECATED DOG DANDER IGE RAST QL: 0
DEPRECATED EGG WHITE IGE RAST QL: 0
DEPRECATED M RACEMOSUS IGE RAST QL: 0
DEPRECATED OAT IGE RAST QL: 0
DEPRECATED PEANUT IGE RAST QL: 0
DEPRECATED ROACH IGE RAST QL: NORMAL
DEPRECATED RYE IGE RAST QL: 0
DEPRECATED SOYBEAN IGE RAST QL: 0
DEPRECATED TIMOTHY IGE RAST QL: 0
DEPRECATED WHEAT IGE RAST QL: 0
DEPRECATED WHITE OAK IGE RAST QL: 0
DOG DANDER IGE QN: <0.1 KUA/L
EGG WHITE IGE QN: <0.1 KUA/L
EOSINOPHIL # BLD AUTO: 0.04 K/UL
EOSINOPHIL NFR BLD AUTO: 0.9 %
ERYTHROCYTE [SEDIMENTATION RATE] IN BLOOD BY WESTERGREN METHOD: 7 MM/HR
FUNGITELL QUANTITATIVE VALUE: <31 PG/ML
HCT VFR BLD CALC: 38.3 %
HGB BLD-MCNC: 12.6 G/DL
IMM GRANULOCYTES NFR BLD AUTO: 0.2 %
LYMPHOCYTES # BLD AUTO: 1.57 K/UL
LYMPHOCYTES NFR BLD AUTO: 34.1 %
M RACEMOSUS IGE QN: <0.1 KUA/L
MAN DIFF?: NORMAL
MCHC RBC-ENTMCNC: 30.6 PG
MCHC RBC-ENTMCNC: 32.9 GM/DL
MCV RBC AUTO: 93 FL
MONOCYTES # BLD AUTO: 0.32 K/UL
MONOCYTES NFR BLD AUTO: 7 %
NEUTROPHILS # BLD AUTO: 2.63 K/UL
NEUTROPHILS NFR BLD AUTO: 57.1 %
OAT IGE QN: <0.1 KUA/L
PEANUT IGE QN: <0.1 KUA/L
PLATELET # BLD AUTO: 161 K/UL
RBC # BLD: 4.12 M/UL
RBC # FLD: 11.9 %
ROACH IGE QN: 0.12 KUA/L
RYE IGE QN: <0.1 KUA/L
SOYBEAN IGE QN: <0.1 KUA/L
TIMOTHY IGE QN: <0.1 KUA/L
TOTAL IGE SMQN RAST: 118 KU/L
WBC # FLD AUTO: 4.6 K/UL
WHEAT IGE QN: <0.1 KUA/L
WHITE OAK IGE QN: <0.1 KUA/L

## 2023-01-13 LAB
ALTERN TENCAPG(M6): 2.9 MCG/ML
ANA SER IF-ACNC: NEGATIVE
ASPER FUMCAPG(M3): 28.9 MCG/ML
AUREOBASCAPG(M12): 4.9 MCG/ML
ENA JO1 AB SER IA-ACNC: <0.2 AL
ENA RNP AB SER IA-ACNC: <0.2 AL
LACEYELLA SACCHARI IGG: 4.1 MCG/ML
MICROPOLYCAPG(M22): <2 MCG/ML
PENIC CHRYCAPG(M1): 33.8 MCG/ML
PHOMA BETAE IGG: 4.6 MCG/ML
TRICHODERMA VIRIDE IGG: <2 MCG/ML

## 2023-01-16 LAB
ANTI-PM/SCL-100 AB: <20 UNITS
M TB IFN-G BLD-IMP: NEGATIVE
QUANTIFERON TB PLUS MITOGEN MINUS NIL: 9.64 IU/ML
QUANTIFERON TB PLUS NIL: 0.01 IU/ML
QUANTIFERON TB PLUS TB1 MINUS NIL: 0.01 IU/ML
QUANTIFERON TB PLUS TB2 MINUS NIL: 0.01 IU/ML

## 2023-01-17 ENCOUNTER — NON-APPOINTMENT (OUTPATIENT)
Age: 58
End: 2023-01-17

## 2023-01-19 LAB — IGE AB SERPL QL: 88 NG/ML

## 2023-01-20 ENCOUNTER — APPOINTMENT (OUTPATIENT)
Dept: GASTROENTEROLOGY | Facility: CLINIC | Age: 58
End: 2023-01-20
Payer: COMMERCIAL

## 2023-01-20 VITALS
BODY MASS INDEX: 22.38 KG/M2 | TEMPERATURE: 98.6 F | SYSTOLIC BLOOD PRESSURE: 130 MMHG | OXYGEN SATURATION: 98 % | WEIGHT: 111 LBS | HEIGHT: 59 IN | RESPIRATION RATE: 14 BRPM | DIASTOLIC BLOOD PRESSURE: 76 MMHG | HEART RATE: 81 BPM

## 2023-01-20 DIAGNOSIS — K59.00 CONSTIPATION, UNSPECIFIED: ICD-10-CM

## 2023-01-20 DIAGNOSIS — R13.10 DYSPHAGIA, UNSPECIFIED: ICD-10-CM

## 2023-01-20 DIAGNOSIS — Z09 ENCOUNTER FOR FOLLOW-UP EXAMINATION AFTER COMPLETED TREATMENT FOR CONDITIONS OTHER THAN MALIGNANT NEOPLASM: ICD-10-CM

## 2023-01-20 DIAGNOSIS — K29.60 OTHER GASTRITIS W/OUT BLEEDING: ICD-10-CM

## 2023-01-20 DIAGNOSIS — R10.13 EPIGASTRIC PAIN: ICD-10-CM

## 2023-01-20 PROCEDURE — 99214 OFFICE O/P EST MOD 30 MIN: CPT

## 2023-01-20 NOTE — PHYSICAL EXAM
[Alert] : alert [Normal Voice/Communication] : normal voice/communication [Healthy Appearing] : healthy appearing [No Acute Distress] : no acute distress [Well Developed] : well developed [Well Nourished] : well nourished [Sclera] : the sclera and conjunctiva were normal [Hearing Threshold Finger Rub Not [place]] : hearing was normal [Normal Lips/Gums] : the lips and gums were normal [Oropharynx] : the oropharynx was normal [Normal Appearance] : the appearance of the neck was normal [No Neck Mass] : no neck mass was observed [No Respiratory Distress] : no respiratory distress [No Acc Muscle Use] : no accessory muscle use [Respiration, Rhythm And Depth] : normal respiratory rhythm and effort [Auscultation Breath Sounds / Voice Sounds] : lungs were clear to auscultation bilaterally [Heart Rate And Rhythm] : heart rate was normal and rhythm regular [Normal S1, S2] : normal S1 and S2 [Murmurs] : no murmurs [None] : no edema [Bowel Sounds] : normal bowel sounds [Abdomen Tenderness] : non-tender [No Masses] : no abdominal mass palpated [Abdomen Soft] : soft [] : no hepatosplenomegaly [Oriented To Time, Place, And Person] : oriented to person, place, and time

## 2023-01-20 NOTE — REASON FOR VISIT
[Follow-up] : a follow-up of an existing diagnosis [FreeTextEntry1] : For dysphagia and epigastric pain

## 2023-01-20 NOTE — ASSESSMENT
[FreeTextEntry1] : Impression: Epigastric pain likely secondary to recently seen erosive gastritis which has not yet been treated.  Dysphagia with negative esophageal pathology as outlined above rule out possible reflux related dysphagia.\par \par Recommendations: Pantoprazole 40 mg once daily was prescribed for treatment of both her erosive gastritis and for possible reflux related dysphagia.  A low-fat antireflux diet was also recommended and explained to the patient and her  who accompanied the patient today both of whom appeared to understand all of the above instructions, information, and management plan.  She is to return here in 3 months time for follow-up evaluation.

## 2023-01-20 NOTE — HISTORY OF PRESENT ILLNESS
[de-identified] : 11/4/2022.  Erosive gastritis with negative esophageal biopsies for eosinophilic esophagitis or microscopic esophagitis.  Gastric biopsies negative for H. pylori. [FreeTextEntry1] : 11/4/2022.  Melanosis coli and internal hemorrhoids.

## 2023-01-23 ENCOUNTER — APPOINTMENT (OUTPATIENT)
Dept: PULMONOLOGY | Facility: CLINIC | Age: 58
End: 2023-01-23

## 2023-01-24 ENCOUNTER — RESULT REVIEW (OUTPATIENT)
Age: 58
End: 2023-01-24

## 2023-01-24 ENCOUNTER — APPOINTMENT (OUTPATIENT)
Dept: CT IMAGING | Facility: CLINIC | Age: 58
End: 2023-01-24
Payer: COMMERCIAL

## 2023-01-24 PROCEDURE — 75574 CT ANGIO HRT W/3D IMAGE: CPT

## 2023-02-06 ENCOUNTER — NON-APPOINTMENT (OUTPATIENT)
Age: 58
End: 2023-02-06

## 2023-02-07 ENCOUNTER — APPOINTMENT (OUTPATIENT)
Dept: PULMONOLOGY | Facility: CLINIC | Age: 58
End: 2023-02-07
Payer: COMMERCIAL

## 2023-02-07 VITALS
SYSTOLIC BLOOD PRESSURE: 136 MMHG | HEART RATE: 97 BPM | BODY MASS INDEX: 21.37 KG/M2 | OXYGEN SATURATION: 98 % | DIASTOLIC BLOOD PRESSURE: 78 MMHG | RESPIRATION RATE: 14 BRPM | WEIGHT: 106 LBS | HEIGHT: 59 IN

## 2023-02-07 DIAGNOSIS — F17.208 NICOTINE DEPENDENCE, UNSPECIFIED, WITH OTHER NICOTINE-INDUCED DISORDERS: ICD-10-CM

## 2023-02-07 DIAGNOSIS — J06.0 ACUTE LARYNGOPHARYNGITIS: ICD-10-CM

## 2023-02-07 DIAGNOSIS — Z01.811 ENCOUNTER FOR PREPROCEDURAL RESPIRATORY EXAMINATION: ICD-10-CM

## 2023-02-07 DIAGNOSIS — J45.909 UNSPECIFIED ASTHMA, UNCOMPLICATED: ICD-10-CM

## 2023-02-07 PROCEDURE — 99213 OFFICE O/P EST LOW 20 MIN: CPT

## 2023-02-07 NOTE — ASSESSMENT
[FreeTextEntry1] : 56y/o female born in [place] \par \par \par 1-  pre-operative respiratory exam   hand surgery\par 2-   ex smoker > 20 pack year   /   current vaping  \par 3-  asthma   / + HP only one  /  ?  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 vaping related ) \par 7- vaccinations - per  primary \par \par Recommendations\par 1- duonebs q 4-  8 hour prn \par 2-  ENT  input\par 3- discussed to quit vaping - LOGICS menthol -  side effects   chest pain sob   respiratory failure \par 4- f/u ct 1/24    PFT 1/24\par 5- albuterol MDI  two inh  q 6hour prn \par 6- PPI \par \par Ms Mahoney  has no pulmonary contraindications to hand surgery.  Please feel free to contact me with any questions or suggestions. \par \par patient agrees   4-6 months f/u \par \par

## 2023-02-07 NOTE — REASON FOR VISIT
[Follow-Up] : a follow-up visit [Pre-op Risk Stratification] : pre-op risk stratification [Abnormal CXR/ Chest CT] : an abnormal CXR/ chest CT [Nicotine Dependence] : nicotine dependence

## 2023-02-07 NOTE — PHYSICAL EXAM
[II] : Mallampati Class: II [Normal Appearance] : normal appearance [Supple] : supple [No JVD] : no jvd [Normal S1, S2] : normal s1, s2 [Clear to Auscultation Bilaterally] : clear to auscultation bilaterally [Benign] : benign [Not Tender] : not tender [No Hernias] : no hernias [Normal Bowel Sounds] : normal bowel sounds [Normal Gait] : normal gait [No Clubbing] : no clubbing [No Edema] : no edema [No Rash] : no rash [No Motor Deficits] : no motor deficits [Normal Affect] : normal affect [TextBox_2] : pleasant f no distress speaking full sentences  no cough  [TextBox_11] : moist no   lesions  tonsils  normal   no exudates

## 2023-02-07 NOTE — COUNSELING
[Use of nicotine replacement therapies and other medications discussed] : Use of nicotine replacement therapies and other medications discussed

## 2023-02-07 NOTE — REVIEW OF SYSTEMS
[Chest Discomfort] : chest discomfort [Chronic Pain] : chronic pain [Blood Transfusion] : blood transfusion [Fever] : no fever [Recent Wt Gain (___ Lbs)] : ~T no recent weight gain [Chills] : no chills [Recent Wt Loss (___ Lbs)] : ~T no recent weight loss [Dry Eyes] : no dry eyes [Sore Throat] : no sore throat [Dry Mouth] : no dry mouth [Cough] : no cough [Hemoptysis] : no hemoptysis [Chest Tightness] : no chest tightness [Dyspnea] : no dyspnea [Wheezing] : no wheezing [SOB on Exertion] : no sob on exertion [GERD] : no gerd [Abdominal Pain] : no abdominal pain [Nausea] : no nausea [Vomiting] : no vomiting [Dysphagia] : no dysphagia [Bleeding] : no bleeding [Rash] : no rash [Clotting Disorder/ Frequent bleeding] : no clotting disorder/ frequent bleeding [Diabetes] : no diabetes [Thyroid Problem] : no thyroid problem [Obesity] : no obesity [TextBox_14] : some  neck pain with swallowing at times      change in voice  [TextBox_69] : h/o   vomiting in past  [TextBox_94] : pain muscles arthritis

## 2023-02-07 NOTE — HISTORY OF PRESENT ILLNESS
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\par \par \par 1/9/23\par 56y/o  female   born [place]   ex smoker ( 18-  53y/o  quit 2020-  one pack day  26pack years)   vaping now  not ready to quit -  h/o  vavlular heart disease -  echo 2021  moderate AI,   TIA on plavix, -  covid   2020 -   3.5 months   sick -   bad resp symptoms  home only -  work hx:  stop and shop now for  pre-operative respiratory  clearance right   hand for   tendon issues \par \par - 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 eitology  on plavix  \par \par 2/7/23\par 59y/o  female born [place]  ex smoker > 20 pack years   vaping  (3 /day of   Logic methanol with nicotine ) not ready to quit  pre-operative resp clearance right  hand surgery \par - two puppies \par - ct reviewed \par -\par \par \par  [TextBox_11] : 1 [TextBox_13] : 26 [YearQuit] : 2020 [TextBox_22] : 3 catridges/ day     LOGIC METHANOL

## 2023-02-15 ENCOUNTER — APPOINTMENT (OUTPATIENT)
Dept: RHEUMATOLOGY | Facility: CLINIC | Age: 58
End: 2023-02-15
Payer: COMMERCIAL

## 2023-02-15 VITALS
HEART RATE: 89 BPM | WEIGHT: 103 LBS | TEMPERATURE: 98.1 F | HEIGHT: 59 IN | DIASTOLIC BLOOD PRESSURE: 84 MMHG | RESPIRATION RATE: 17 BRPM | SYSTOLIC BLOOD PRESSURE: 140 MMHG | OXYGEN SATURATION: 98 % | BODY MASS INDEX: 20.76 KG/M2

## 2023-02-15 PROCEDURE — 99205 OFFICE O/P NEW HI 60 MIN: CPT

## 2023-02-15 RX ORDER — IPRATROPIUM BROMIDE AND ALBUTEROL SULFATE 2.5; .5 MG/3ML; MG/3ML
0.5-2.5 (3) SOLUTION RESPIRATORY (INHALATION)
Qty: 90 | Refills: 0 | Status: DISCONTINUED | COMMUNITY
Start: 2022-09-26 | End: 2023-02-15

## 2023-02-15 RX ORDER — LEVOTHYROXINE SODIUM 0.05 MG/1
50 TABLET ORAL
Qty: 30 | Refills: 3 | Status: DISCONTINUED | COMMUNITY
Start: 2020-04-08 | End: 2023-02-15

## 2023-02-15 RX ORDER — PANTOPRAZOLE 40 MG/1
40 TABLET, DELAYED RELEASE ORAL
Qty: 30 | Refills: 5 | Status: DISCONTINUED | COMMUNITY
Start: 2023-01-20 | End: 2023-02-15

## 2023-02-15 NOTE — ASSESSMENT
[FreeTextEntry1] : LBP with radicular symptoms\par \par - labs and imaging as below\par - start tizanidine 2 mg at bedtime\par - NSAIDS/Tylenol prn for pain\par - OTC topical analgesics\par \par Discussed treatment plan with the patient. The patient was given the opportunity to ask questions and all questions were answered to their satisfaction.\par

## 2023-02-15 NOTE — HISTORY OF PRESENT ILLNESS
[FreeTextEntry1] : 58 year old female with PMH as listed below presents today for an initial evaluation for joint pain\par \par Over the last year reports to have increased pain to her low back\par Has pain when she lays down, starts about 1/2 hour later after she lays down. \par Has sharp, stabbing pain\par Occasionally get numbness/tingling sensation going down BL legs, pain wakes her up at night\par Tries to change positions with no improvement\par \par Has minimal pain when seated, standing\par Takes ibuprofen prn for pain\par \par No recent falls, trauma\par denies fever, chills, oral/nasal  ulcers, joint swelling,  denies rashes, denies blood clots,  raynauds\par \par Planned for Right CTS tomorrow\par \par Labs/chart reviewed. No imaging studies of the back

## 2023-04-11 ENCOUNTER — APPOINTMENT (OUTPATIENT)
Dept: RHEUMATOLOGY | Facility: CLINIC | Age: 58
End: 2023-04-11
Payer: COMMERCIAL

## 2023-04-11 VITALS
HEIGHT: 59 IN | DIASTOLIC BLOOD PRESSURE: 90 MMHG | RESPIRATION RATE: 17 BRPM | TEMPERATURE: 98 F | SYSTOLIC BLOOD PRESSURE: 160 MMHG | HEART RATE: 88 BPM | WEIGHT: 103 LBS | OXYGEN SATURATION: 98 % | BODY MASS INDEX: 20.76 KG/M2

## 2023-04-11 DIAGNOSIS — M54.50 LOW BACK PAIN, UNSPECIFIED: ICD-10-CM

## 2023-04-11 PROCEDURE — 99214 OFFICE O/P EST MOD 30 MIN: CPT

## 2023-04-11 RX ORDER — TIZANIDINE 2 MG/1
2 TABLET ORAL
Qty: 30 | Refills: 0 | Status: ACTIVE | COMMUNITY
Start: 2023-02-15 | End: 1900-01-01

## 2023-04-13 PROBLEM — M54.50 LOW BACK PAIN: Status: ACTIVE | Noted: 2023-02-15

## 2023-04-13 NOTE — HISTORY OF PRESENT ILLNESS
[FreeTextEntry1] : Pt presenting today for a f/u visit for LBP. \par Recent MRI L spine 03/2023 reviewed with pt in length- has L4-L5 disc bulge, moderate right foraminal stenosis, spinal canal stenosis. \par Today presenting with continued LBP with radicular symptoms. \par LV prescribed tizanidine. She did not take the medication yet. \par d/w the pt further treatment options- including trial with medications, PT, pain management evaluation. \par At this time she is requested referral for pain management. She will see them if the medication does not help. \par Denies fever, chills, CP, SOB, abd pain, rashes.

## 2023-04-13 NOTE — ASSESSMENT
[FreeTextEntry1] : LBP with radicular symptoms\par \par MRI L spine 03/2023: disc bulges, moderate right foraminal stenosis, spinal canal stenosis. \par \par - start tizanidine 2 mg at bedtime (reviewed risk and benefits of medications)\par - NSAIDS/Tylenol prn for pain\par - OTC topical analgesics\par - consider pain management evaluation for epidural injections\par \par Discussed treatment plan with the patient. The patient was given the opportunity to ask questions and all questions were answered to their satisfaction.\par

## 2023-04-24 ENCOUNTER — APPOINTMENT (OUTPATIENT)
Dept: GASTROENTEROLOGY | Facility: CLINIC | Age: 58
End: 2023-04-24

## 2023-06-12 ENCOUNTER — APPOINTMENT (OUTPATIENT)
Dept: CARDIOLOGY | Facility: CLINIC | Age: 58
End: 2023-06-12

## 2023-08-10 ENCOUNTER — APPOINTMENT (OUTPATIENT)
Dept: PULMONOLOGY | Facility: CLINIC | Age: 58
End: 2023-08-10

## 2023-08-26 NOTE — ED PROVIDER NOTE - CROS ED ROS STATEMENT
9.5    12.36 )-----------( 253      ( 26 Aug 2023 09:16 )             28.2       08-26    136  |  101  |  15.1  ----------------------------<  119<H>  4.7   |  21.0<L>  |  1.13    Ca    8.6      26 Aug 2023 09:16  Mg     1.7     08-26    TPro  5.6<L>  /  Alb  3.0<L>  /  TBili  0.7  /  DBili  x   /  AST  43<H>  /  ALT  31  /  AlkPhos  131<H>  08-26
all other ROS negative except as per HPI

## 2023-10-03 NOTE — ED ADULT TRIAGE NOTE - NS ED NURSE BANDS TYPE
----- Message from Apurva Flynn PA-C sent at 10/2/2023  3:44 PM CDT -----  Colonoscopy completed in polyps removed are consistent with inflammatory polyps are benign hyperplastic polyps..  Due to personal history of colon polyps recommended a 5 year recall    Name band;

## 2024-01-22 NOTE — H&P ADULT - NSHPPOADEEPVENOUSTHROMB_GEN_A_CORE
Arterial Line:    Date/Time: 1/22/2024 8:23 AM    Staffing  Performed: attending   Authorized by: Chaitanya Mcmillan MD    Performed by: NILDA Hubbard    An arterial line was placed. Procedure performed using ultrasound guidance and surface landmarks.in the OR for the following indication(s): continuous blood pressure monitoring.    A 20 gauge (size), 1 and 3/4 inch (length), Arrow (type) catheter was placed into the Right radial artery, secured by Tegadepatricia   Seldinger technique used.  Events:  patient tolerated procedure well with no complications.             no

## 2024-03-24 ENCOUNTER — EMERGENCY (EMERGENCY)
Facility: HOSPITAL | Age: 59
LOS: 1 days | Discharge: HOME | End: 2024-03-24
Attending: EMERGENCY MEDICINE
Payer: COMMERCIAL

## 2024-03-24 VITALS
HEIGHT: 60 IN | DIASTOLIC BLOOD PRESSURE: 85 MMHG | OXYGEN SATURATION: 98 % | HEART RATE: 92 BPM | WEIGHT: 97 LBS | SYSTOLIC BLOOD PRESSURE: 172 MMHG | RESPIRATION RATE: 18 BRPM | TEMPERATURE: 98 F

## 2024-03-24 DIAGNOSIS — Z90.49 ACQUIRED ABSENCE OF OTHER SPECIFIED PARTS OF DIGESTIVE TRACT: Chronic | ICD-10-CM

## 2024-03-24 PROCEDURE — 99285 EMERGENCY DEPT VISIT HI MDM: CPT

## 2024-03-24 PROCEDURE — 93010 ELECTROCARDIOGRAM REPORT: CPT

## 2024-03-24 NOTE — ED ADULT TRIAGE NOTE - CHIEF COMPLAINT QUOTE
Ambulatory to ED c/o central chest pain for 3 hours with sweats, nausea, palpitations, intermittent SOB.  denies back pain, vomiting, abd pain, fever.  no meds given pta.  reports hx  mitral and aortic regurgitation, no MI/stents.  pt was watching a game when symptoms started.  ekg completed in triage

## 2024-03-25 ENCOUNTER — RESULT REVIEW (OUTPATIENT)
Age: 59
End: 2024-03-25

## 2024-03-25 VITALS
DIASTOLIC BLOOD PRESSURE: 56 MMHG | SYSTOLIC BLOOD PRESSURE: 98 MMHG | RESPIRATION RATE: 18 BRPM | OXYGEN SATURATION: 98 % | HEART RATE: 66 BPM

## 2024-03-25 DIAGNOSIS — R00.2 PALPITATIONS: ICD-10-CM

## 2024-03-25 DIAGNOSIS — I10 ESSENTIAL (PRIMARY) HYPERTENSION: ICD-10-CM

## 2024-03-25 DIAGNOSIS — R07.9 CHEST PAIN, UNSPECIFIED: ICD-10-CM

## 2024-03-25 DIAGNOSIS — R31.9 HEMATURIA, UNSPECIFIED: ICD-10-CM

## 2024-03-25 LAB
ALBUMIN SERPL ELPH-MCNC: 4 G/DL — SIGNIFICANT CHANGE UP (ref 3.3–5.2)
ALP SERPL-CCNC: 56 U/L — SIGNIFICANT CHANGE UP (ref 40–120)
ALT FLD-CCNC: 17 U/L — SIGNIFICANT CHANGE UP
ANION GAP SERPL CALC-SCNC: 11 MMOL/L — SIGNIFICANT CHANGE UP (ref 5–17)
APPEARANCE UR: CLEAR — SIGNIFICANT CHANGE UP
APTT BLD: 28.9 SEC — SIGNIFICANT CHANGE UP (ref 24.5–35.6)
AST SERPL-CCNC: 16 U/L — SIGNIFICANT CHANGE UP
BACTERIA # UR AUTO: NEGATIVE /HPF — SIGNIFICANT CHANGE UP
BASOPHILS # BLD AUTO: 0.04 K/UL — SIGNIFICANT CHANGE UP (ref 0–0.2)
BASOPHILS NFR BLD AUTO: 0.4 % — SIGNIFICANT CHANGE UP (ref 0–2)
BILIRUB SERPL-MCNC: <0.2 MG/DL — LOW (ref 0.4–2)
BILIRUB UR-MCNC: NEGATIVE — SIGNIFICANT CHANGE UP
BUN SERPL-MCNC: 24.1 MG/DL — HIGH (ref 8–20)
CALCIUM SERPL-MCNC: 9 MG/DL — SIGNIFICANT CHANGE UP (ref 8.4–10.5)
CAST: 1 /LPF — SIGNIFICANT CHANGE UP (ref 0–4)
CHLORIDE SERPL-SCNC: 103 MMOL/L — SIGNIFICANT CHANGE UP (ref 96–108)
CO2 SERPL-SCNC: 25 MMOL/L — SIGNIFICANT CHANGE UP (ref 22–29)
COLOR SPEC: YELLOW — SIGNIFICANT CHANGE UP
CREAT SERPL-MCNC: 0.67 MG/DL — SIGNIFICANT CHANGE UP (ref 0.5–1.3)
DIFF PNL FLD: NEGATIVE — SIGNIFICANT CHANGE UP
EGFR: 101 ML/MIN/1.73M2 — SIGNIFICANT CHANGE UP
EOSINOPHIL # BLD AUTO: 0.15 K/UL — SIGNIFICANT CHANGE UP (ref 0–0.5)
EOSINOPHIL NFR BLD AUTO: 1.4 % — SIGNIFICANT CHANGE UP (ref 0–6)
GLUCOSE SERPL-MCNC: 130 MG/DL — HIGH (ref 70–99)
GLUCOSE UR QL: NEGATIVE MG/DL — SIGNIFICANT CHANGE UP
HCT VFR BLD CALC: 39.4 % — SIGNIFICANT CHANGE UP (ref 34.5–45)
HGB BLD-MCNC: 13.5 G/DL — SIGNIFICANT CHANGE UP (ref 11.5–15.5)
IMM GRANULOCYTES NFR BLD AUTO: 0.5 % — SIGNIFICANT CHANGE UP (ref 0–0.9)
INR BLD: 0.89 RATIO — SIGNIFICANT CHANGE UP (ref 0.85–1.18)
KETONES UR-MCNC: ABNORMAL MG/DL
LEUKOCYTE ESTERASE UR-ACNC: ABNORMAL
LYMPHOCYTES # BLD AUTO: 1.64 K/UL — SIGNIFICANT CHANGE UP (ref 1–3.3)
LYMPHOCYTES # BLD AUTO: 15.8 % — SIGNIFICANT CHANGE UP (ref 13–44)
MCHC RBC-ENTMCNC: 30.8 PG — SIGNIFICANT CHANGE UP (ref 27–34)
MCHC RBC-ENTMCNC: 34.3 GM/DL — SIGNIFICANT CHANGE UP (ref 32–36)
MCV RBC AUTO: 89.7 FL — SIGNIFICANT CHANGE UP (ref 80–100)
MONOCYTES # BLD AUTO: 0.79 K/UL — SIGNIFICANT CHANGE UP (ref 0–0.9)
MONOCYTES NFR BLD AUTO: 7.6 % — SIGNIFICANT CHANGE UP (ref 2–14)
NEUTROPHILS # BLD AUTO: 7.69 K/UL — HIGH (ref 1.8–7.4)
NEUTROPHILS NFR BLD AUTO: 74.3 % — SIGNIFICANT CHANGE UP (ref 43–77)
NITRITE UR-MCNC: NEGATIVE — SIGNIFICANT CHANGE UP
PH UR: 6 — SIGNIFICANT CHANGE UP (ref 5–8)
PLATELET # BLD AUTO: 207 K/UL — SIGNIFICANT CHANGE UP (ref 150–400)
POTASSIUM SERPL-MCNC: 4.6 MMOL/L — SIGNIFICANT CHANGE UP (ref 3.5–5.3)
POTASSIUM SERPL-SCNC: 4.6 MMOL/L — SIGNIFICANT CHANGE UP (ref 3.5–5.3)
PROT SERPL-MCNC: 6.6 G/DL — SIGNIFICANT CHANGE UP (ref 6.6–8.7)
PROT UR-MCNC: NEGATIVE MG/DL — SIGNIFICANT CHANGE UP
PROTHROM AB SERPL-ACNC: 9.9 SEC — SIGNIFICANT CHANGE UP (ref 9.5–13)
RBC # BLD: 4.39 M/UL — SIGNIFICANT CHANGE UP (ref 3.8–5.2)
RBC # FLD: 11.6 % — SIGNIFICANT CHANGE UP (ref 10.3–14.5)
RBC CASTS # UR COMP ASSIST: 1 /HPF — SIGNIFICANT CHANGE UP (ref 0–4)
SODIUM SERPL-SCNC: 139 MMOL/L — SIGNIFICANT CHANGE UP (ref 135–145)
SP GR SPEC: 1.02 — SIGNIFICANT CHANGE UP (ref 1–1.03)
SQUAMOUS # UR AUTO: 1 /HPF — SIGNIFICANT CHANGE UP (ref 0–5)
T3 SERPL-MCNC: 53 NG/DL — LOW (ref 80–200)
T4 AB SER-ACNC: 8.1 UG/DL — SIGNIFICANT CHANGE UP (ref 4.5–12)
TROPONIN T, HIGH SENSITIVITY RESULT: 11 NG/L — SIGNIFICANT CHANGE UP (ref 0–51)
TROPONIN T, HIGH SENSITIVITY RESULT: 9 NG/L — SIGNIFICANT CHANGE UP (ref 0–51)
UROBILINOGEN FLD QL: 0.2 MG/DL — SIGNIFICANT CHANGE UP (ref 0.2–1)
WBC # BLD: 10.36 K/UL — SIGNIFICANT CHANGE UP (ref 3.8–10.5)
WBC # FLD AUTO: 10.36 K/UL — SIGNIFICANT CHANGE UP (ref 3.8–10.5)
WBC UR QL: 7 /HPF — HIGH (ref 0–5)

## 2024-03-25 PROCEDURE — 81001 URINALYSIS AUTO W/SCOPE: CPT

## 2024-03-25 PROCEDURE — 36415 COLL VENOUS BLD VENIPUNCTURE: CPT

## 2024-03-25 PROCEDURE — 84484 ASSAY OF TROPONIN QUANT: CPT

## 2024-03-25 PROCEDURE — A9500: CPT

## 2024-03-25 PROCEDURE — 99285 EMERGENCY DEPT VISIT HI MDM: CPT | Mod: 25

## 2024-03-25 PROCEDURE — 93306 TTE W/DOPPLER COMPLETE: CPT

## 2024-03-25 PROCEDURE — 83880 ASSAY OF NATRIURETIC PEPTIDE: CPT

## 2024-03-25 PROCEDURE — 93018 CV STRESS TEST I&R ONLY: CPT | Mod: MC,59

## 2024-03-25 PROCEDURE — 71045 X-RAY EXAM CHEST 1 VIEW: CPT | Mod: 26

## 2024-03-25 PROCEDURE — 84436 ASSAY OF TOTAL THYROXINE: CPT

## 2024-03-25 PROCEDURE — 71045 X-RAY EXAM CHEST 1 VIEW: CPT

## 2024-03-25 PROCEDURE — 84443 ASSAY THYROID STIM HORMONE: CPT

## 2024-03-25 PROCEDURE — 85025 COMPLETE CBC W/AUTO DIFF WBC: CPT

## 2024-03-25 PROCEDURE — 85610 PROTHROMBIN TIME: CPT

## 2024-03-25 PROCEDURE — 87086 URINE CULTURE/COLONY COUNT: CPT

## 2024-03-25 PROCEDURE — G0378: CPT

## 2024-03-25 PROCEDURE — 84480 ASSAY TRIIODOTHYRONINE (T3): CPT

## 2024-03-25 PROCEDURE — 93017 CV STRESS TEST TRACING ONLY: CPT

## 2024-03-25 PROCEDURE — 85730 THROMBOPLASTIN TIME PARTIAL: CPT

## 2024-03-25 PROCEDURE — 99236 HOSP IP/OBS SAME DATE HI 85: CPT

## 2024-03-25 PROCEDURE — 93306 TTE W/DOPPLER COMPLETE: CPT | Mod: 26

## 2024-03-25 PROCEDURE — 78452 HT MUSCLE IMAGE SPECT MULT: CPT | Mod: 26,59,MC

## 2024-03-25 PROCEDURE — 99285 EMERGENCY DEPT VISIT HI MDM: CPT

## 2024-03-25 PROCEDURE — 93016 CV STRESS TEST SUPVJ ONLY: CPT | Mod: 59,MC

## 2024-03-25 PROCEDURE — 78452 HT MUSCLE IMAGE SPECT MULT: CPT | Mod: MC

## 2024-03-25 PROCEDURE — 93005 ELECTROCARDIOGRAM TRACING: CPT

## 2024-03-25 PROCEDURE — 80053 COMPREHEN METABOLIC PANEL: CPT

## 2024-03-25 PROCEDURE — 93010 ELECTROCARDIOGRAM REPORT: CPT

## 2024-03-25 RX ORDER — ATORVASTATIN CALCIUM 80 MG/1
40 TABLET, FILM COATED ORAL AT BEDTIME
Refills: 0 | Status: DISCONTINUED | OUTPATIENT
Start: 2024-03-25 | End: 2024-04-01

## 2024-03-25 RX ORDER — OMEPRAZOLE 10 MG/1
1 CAPSULE, DELAYED RELEASE ORAL
Qty: 14 | Refills: 0
Start: 2024-03-25 | End: 2024-04-07

## 2024-03-25 RX ORDER — PANTOPRAZOLE SODIUM 20 MG/1
40 TABLET, DELAYED RELEASE ORAL ONCE
Refills: 0 | Status: COMPLETED | OUTPATIENT
Start: 2024-03-25 | End: 2024-03-25

## 2024-03-25 RX ORDER — CLOPIDOGREL BISULFATE 75 MG/1
75 TABLET, FILM COATED ORAL DAILY
Refills: 0 | Status: DISCONTINUED | OUTPATIENT
Start: 2024-03-25 | End: 2024-04-01

## 2024-03-25 RX ORDER — LEVOTHYROXINE SODIUM 125 MCG
175 TABLET ORAL DAILY
Refills: 0 | Status: DISCONTINUED | OUTPATIENT
Start: 2024-03-25 | End: 2024-04-01

## 2024-03-25 RX ORDER — FUROSEMIDE 40 MG
20 TABLET ORAL DAILY
Refills: 0 | Status: DISCONTINUED | OUTPATIENT
Start: 2024-03-25 | End: 2024-04-01

## 2024-03-25 RX ORDER — ASPIRIN/CALCIUM CARB/MAGNESIUM 324 MG
324 TABLET ORAL ONCE
Refills: 0 | Status: COMPLETED | OUTPATIENT
Start: 2024-03-25 | End: 2024-03-25

## 2024-03-25 RX ORDER — AMLODIPINE BESYLATE 2.5 MG/1
10 TABLET ORAL DAILY
Refills: 0 | Status: DISCONTINUED | OUTPATIENT
Start: 2024-03-25 | End: 2024-04-01

## 2024-03-25 RX ORDER — LOSARTAN POTASSIUM 100 MG/1
100 TABLET, FILM COATED ORAL DAILY
Refills: 0 | Status: DISCONTINUED | OUTPATIENT
Start: 2024-03-25 | End: 2024-04-01

## 2024-03-25 RX ADMIN — Medication 175 MICROGRAM(S): at 06:14

## 2024-03-25 RX ADMIN — PANTOPRAZOLE SODIUM 40 MILLIGRAM(S): 20 TABLET, DELAYED RELEASE ORAL at 15:28

## 2024-03-25 RX ADMIN — CLOPIDOGREL BISULFATE 75 MILLIGRAM(S): 75 TABLET, FILM COATED ORAL at 13:53

## 2024-03-25 RX ADMIN — LOSARTAN POTASSIUM 100 MILLIGRAM(S): 100 TABLET, FILM COATED ORAL at 06:14

## 2024-03-25 RX ADMIN — Medication 324 MILLIGRAM(S): at 00:35

## 2024-03-25 RX ADMIN — Medication 20 MILLIGRAM(S): at 06:14

## 2024-03-25 RX ADMIN — AMLODIPINE BESYLATE 10 MILLIGRAM(S): 2.5 TABLET ORAL at 06:14

## 2024-03-25 NOTE — ED ADULT NURSE NOTE - OBJECTIVE STATEMENT
pt presents to ED in NAD c/o intermittent chest pain x1 month worse with activity. pt states she was at a hockey game today when she suddenly developed left-sided chest pain. pt endorses nausea and lightheadedness. at time of assessment, pt denies chest pain, N/V, SOB, fever, chills, lightheadedness, and dizziness. pt breathing even and unlabored at this time.

## 2024-03-25 NOTE — ED CDU PROVIDER DISPOSITION NOTE - PATIENT PORTAL LINK FT
You can access the FollowMyHealth Patient Portal offered by [facility] by registering at the following website: [email or web address]. By joining [facility]’s FollowMyHealth portal, you will also be able to view your health information using other applications (apps) compatible with our system.

## 2024-03-25 NOTE — ED CDU PROVIDER INITIAL DAY NOTE - OBJECTIVE STATEMENT
59 year old female with no med hx presented to ED c/o chest pain and palpitations x 3/4 days. Pt states constant midsternal chest pain x 4 days, radiates to the left worse with exertion. Follows with Dr. Rodriguez, as per pt had a TTE 6 weeks ago. + nicotine smoker w/ vape. denies abd pain, sob, fever/chills, n/v.

## 2024-03-25 NOTE — ED PROVIDER NOTE - CLINICAL SUMMARY MEDICAL DECISION MAKING FREE TEXT BOX
59-year-old female with history of TIA on Plavix, HTN presenting with chest pain.   Patient is currently without chest pain and is nontoxic-appearing.  Physical exam is benign.  EKG without STEMI normal sinus.  Will check basic labs,  troponin, chest x-ray to evaluate for pneumonia, pneumothorax, ACS.  Will obtain repeat EKG.  Will likely keep for cardiology eval given recurrent chest pain that appears to be exertional in nature.

## 2024-03-25 NOTE — ED ADULT NURSE NOTE - ED STAT RN HANDOFF DETAILS
Pt verbalizes understanding & denies any questions regarding d/c instructions & follow-up. Will return to ED for any new or worsening symptoms.

## 2024-03-25 NOTE — ED CDU PROVIDER DISPOSITION NOTE - CLINICAL COURSE
59-year-old female with history of TIA on Plavix, HTN presenting with chest pain.  Patient reports that she has had intermittent chest pain over the past month that is worse with exertion and is associated with dyspnea on exertion. Pt placed in obs for ischemic eval, PT with no acute findings on evaluations, educated about when to return to the ED if needed. PT verbalizes that he understands all instructions and results. Pt infomred that ED is open and availible 24/7 365 days a yr, encouraged to return to the ED if they have any change in condition, or feel the need for revaluation.

## 2024-03-25 NOTE — ED PROVIDER NOTE - OBJECTIVE STATEMENT
59-year-old female with history of TIA on Plavix, HTN presenting with chest pain.  Patient reports that she has had intermittent chest pain over the past month that is worse with exertion and is associated with dyspnea on exertion.  Today while at a hockey game she developed sudden onset left-sided chest pressure that was persistent and associated with lightheadedness, dyspnea, nausea.  She denies a history of MI or stents in the past.  Reports having an echocardiogram done with Dr. Piter jamil about 3 months ago but has not undergone recent stress testing.  She denies a history of blood clots, hormone therapy, recent prolonged immobility, leg pain or leg swelling.  Denies any fevers, abdominal pain, vomiting.  Patient reports that she had an adverse reaction to aspirin when she was 18 that consisted of vomiting.  However she denies any rashes, throat swelling, face swelling, other reactions to aspirin.  She reports that she is taken other NSAIDs without issue.   She has a history of smoking 2 packs of cigarettes per day and transition to vaping about 4 years ago.

## 2024-03-25 NOTE — ED PROVIDER NOTE - PROGRESS NOTE DETAILS
Trop stable, repeat EKG without acute changes. Patient seen by cards. Will place in obs unit for NST and TTE Melonie Rosenthal MD PGY-3

## 2024-03-25 NOTE — ED CDU PROVIDER DISPOSITION NOTE - ADDITIONAL NOTES AND INSTRUCTIONS:
PT was evaluated At [facility] ED and was found to have a condition that warranted time of to rest and heal from WORK/SCHOOL.   Cecil Calderon PA-C

## 2024-03-25 NOTE — ED ADULT NURSE NOTE - SUICIDE SCREENING DEPRESSION
Some eosinophilic asthma like sy, does not look completely like Eo asthma.  Agree with regimen as above, would however change duoneb to albuterol only. Negative

## 2024-03-25 NOTE — ED ADULT NURSE REASSESSMENT NOTE - NS ED NURSE REASSESS COMMENT FT1
Assumed care of pt from ANEESH Mir.    Pt laying in bed, resting. Pt A&Ox4 in NAD @ this time. RR even & unlabored. Pt NSR on cardiac monitor. Pt denies any pain, complaints, or needs @ this time. Pt awaiting Echo & stress test. Safety maintained.

## 2024-03-25 NOTE — ED CDU PROVIDER INITIAL DAY NOTE - CLINICAL SUMMARY MEDICAL DECISION MAKING FREE TEXT BOX
chest pain pending TTE and NST recommended by SS cards Patient with acute and intermittent chest pain with palpitations. Also complaints of hematuria and urine frequency despite being on Macrobid x1wk.  plan:  -Telemonitoring  - TTE  - NST   - UA

## 2024-03-25 NOTE — ED CDU PROVIDER DISPOSITION NOTE - NSFOLLOWUPINSTRUCTIONS_ED_ALL_ED_FT
Patient education: Chest pain (The Basics)  Written by the doctors and editors at [facility]  Please read the Disclaimer at the end of this page.    Should I call for an ambulance if I have chest pain?  You should call for an ambulance (in the [place] and [place], call 9-1-1) if the pain:    ?Is new or severe    ?Happens along with shortness of breath    ?Lasts more than a few minutes    ?Gets worse when you walk, climb stairs, or do other types of physical activity    ?Scares or worries you    Having chest pain does not necessarily mean that you are having a heart attack. Most people who go to the emergency department with chest pain are not having a heart attack. Their pain is usually caused by less serious problems, such as muscle pain, heartburn, or anxiety. Even so, you should not take any chances.    People often delay seeking help for a heart attack. They might think that their symptoms are not serious or will go away. But waiting to get help can risk permanent damage to the heart, or even death.    Can a heart attack cause other symptoms besides chest pain?  Yes. Chest pain or discomfort is the most common symptom of a heart attack. But there can be other symptoms, too. Sometimes, people do not go to the hospital because they do not have any pain at all. But it is possible to have a heart attack without chest pain. This is more likely in females, people with diabetes, and people older than 60.    Symptoms of a heart attack (figure 1) can include:    ?Pain, pressure, or discomfort in the center of the chest    ?Pain or discomfort in other parts of the upper body, including the shoulders, arms, back, neck, jaw, or stomach    ?Shortness of breath    ?Nausea, vomiting, burping, or heartburn    ?Sweating or having cold, clammy skin    ?Racing or uneven heart rate    ?Feeling dizzy or lightheaded, or even fainting    These symptoms are important if they last more than a few minutes or come and go. If you think that you might be having a heart attack, call for an ambulance (in the [place] and [place], call 9-1-1) right away. Do not try to get to the hospital on your own.    Is heart attack the only cause of chest pain?  No. Chest pain can be caused by lots of other problems. Examples include:    ?Heart problems other than a heart attack, such as infection around the heart    ?Muscle soreness after an activity that involves the chest muscles    ?Diseases that cause pain, such as arthritis    ?Shingles (herpes zoster), a condition linked to the chickenpox virus that also causes a painful rash    ?Any kind of injury to the chest, including surgery    ?Digestive problems like heartburn, acid reflux, stomach ulcers, or irritable bowel syndrome    ?Problems in the lungs, such as pneumonia or blood clots    ?Mental health problems, such as panic disorder or depression    ?Weakening of the lining of the big blood vessel in the chest (called the aorta)    What will happen if I go to the emergency department?  The staff in the emergency department will do an exam. They will also run tests to try to find the cause of your pain. But don't be surprised if you do not find out right away why you have pain. The cause of chest pain is not always easy to find. Even so, doctors can usually tell if your heart is in trouble.    Some tests are done right away in the emergency department. The goal is to figure out as quickly as possible if something serious is causing your chest pain. Other tests are done after this, in the hospital.    Tests might include:    ?Electrocardiogram ("ECG") – This test measures the electrical activity in your heart (figure 2). It can help doctors find out if you are having a heart attack.    ?Blood tests – During a heart attack, the heart releases certain chemicals. If these chemicals are in your blood, it might mean you are having a heart attack.    ?Chest X-ray – This can show some of the problems that can cause chest pain.    ?Stress test – During a stress test, you might be asked to run or walk on a treadmill while you also have an ECG (figure 3). Physical activity increases the heart's need for blood. This test helps doctors see if the heart is getting enough blood. If you cannot walk or run, your doctor might give you a medicine to make your heart pump faster.    ?Cardiac catheterization (also called "cardiac cath") – During this test, the doctor puts a thin tube into a blood vessel in your leg or arm. Then, they move the tube up to your heart. Next, the doctor puts a dye that shows up on X-ray into the tube. This part of the test is called "coronary angiography." It can show whether any of the arteries in your heart are clogged.    ?Echocardiogram – This test uses sound waves to create an image of your heart as it beats. During a heart attack, not all parts of the heart pump normally.    ?CT scan – This is a special kind of X-ray. Your doctor might use this to look at the blood vessels going to your heart.    If it turns out that your pain is related to something other than your heart, your doctor will talk to you about what to do next. You might need other tests or treatments.    What if I am having a heart attack?  If you are having a heart attack, the doctor will give you treatments to reduce the damage to your heart and relieve your pain.    The sooner you get treated for a heart attack, the better treatment will work. Every minute counts when it comes to keeping your heart muscle alive!

## 2024-03-25 NOTE — ED PROVIDER NOTE - PHYSICAL EXAMINATION
Gen: no acute distress  Head: normocephalic, atraumatic  EENT: EOMI  Lung: no increased work of breathing, CTABL, no wheezing, rales, rhonchi  CV: normal s1/s2, rrr, 2+ radial pulses b/l, no LE edema  Abd: soft, non-tender, non-distended, no rebound tenderness or guarding  MSK: no visible deformities, full range of motion in all 4 extremities  Neuro: A&Ox4; No focal neurologic deficits

## 2024-03-25 NOTE — CONSULT NOTE ADULT - PROBLEM SELECTOR RECOMMENDATION 9
-telemetry overnight  -cardiac markers x3  - mg given in ED  -continue Plavix 75 mg  -continue Lipitor 40 mg  -TTE  -NST  -vaping cessation

## 2024-03-25 NOTE — ED ADULT NURSE REASSESSMENT NOTE - NS ED NURSE REASSESS COMMENT FT1
Pt returned to ED from Echo & stress test. Pt A&Ox4 in NAD @ this time. RR even & unlabored. Pt NSR on cardiac monitor. Pt denies any pain, complaints, or needs @ this time. Pt awaiting disposition. Safety maintained.

## 2024-03-25 NOTE — CONSULT NOTE ADULT - ASSESSMENT
This is 60 y/o female with hx of TIA (2019, on Plavix), HTN, current vaper (quit smoking 5 years ago) who presents with chest pain. Pt states that she was at the hockey game eating snacks when she suddenly started feeling palpitations with associated chest tightness. On the way to the hospital pt became nauseous, dizzy and diaphoretic. Pt hypertensive on arrival but BP now improved and symptoms mostly resolved. EKG shows SR with no acute ST/T changes. Troponin 9. Pt follows with Dr. Garcia. Echocardiogram in 12/2021 showed LVEF 55-60%, moderate aortic regurgitation and apical segment hypokinesis. Cardiac cath in 2018 revealed normal coronaries.

## 2024-03-25 NOTE — CONSULT NOTE ADULT - SUBJECTIVE AND OBJECTIVE BOX
[facility]                                              CARDIOLOGY AT [place] [address]                                             Telephone: [phone]. Fax:[phone]                                                       CARDIOLOGY CONSULTATION NOTE                                                                                             History obtained by: Patient and medical record  Community Cardiologist: Dr. Garcia   obtained: Yes [  ] No [x  ]  Reason for Consultation: chest pain  Available out pt records reviewed: Yes [ x ] No [  ]    Chief complaint:  "My heart was pounding"    HPI:  This is 58 y/o female with hx of TIA (2019, on Plavix), HTN, current vaper (quit smoking 5 years ago) who presents with chest pain. Pt states that she was at the hockey game eating snacks when she suddenly started feeling palpitations with associated chest tightness. On the way to the hospital pt became nauseous, dizzy and diaphoretic. Pt hypertensive on arrival but BP now improved and symptoms mostly resolved. EKG shows SR with no acute ST/T changes. Troponin 9. Pt follows with Dr. Garcia. Echocardiogram in 12/2021 showed LVEF 55-60%, moderate aortic regurgitation and apical segment hypokinesis. Cardiac cath in 2018 revealed normal coronaries.     CARDIAC TESTING   ECHO:  < from: TTE Echo w/Cont Complete (08.28.18 @ 07:58) >    Summary:   1. Moderate to severe aortic regurgitation.   2. Left ventricular ejection fraction, by visual estimation, is 65 to   70%.   3. Normal global left ventricular systolic function.   4. Increased LV wall thickness.   5. Spectral Doppler shows impaired relaxation pattern of left   ventricular myocardial filling (Grade I diastolic dysfunction).   6. There is mild concentric left ventricular hypertrophy.   7. Trace tricuspid regurgitation.   8. Trace pulmonic valve regurgitation.    [id number] Allan Mendes MD, Electronically signed on 8/28/2018 at 2:46:09 PM    < end of copied text >      STRESS:  < from: Stress Echocardiogram (08.28.18 @ 14:28) >     Summary:   1. There was no reported chest pain. EKG was not interpretable due to   baseline st/t changes.   2. The patient's functional capacity was good.   3. There is wall motion abnormality involving the basal anterolateral   wall, basal anteroseptal wall. The apical segments are liz   normally. Due to chest pain, EKG abnormality and Echo findings, cardiac   catheterization is advised.    [id number] Med Garcia MD Electronically signed on 8/28/2018 at   5:40:28 PM    < end of copied text >      CATH:   < from: Cardiac Cath Lab - Adult (08.29.18 @ 08:45) >  VENTRICLES: No LV gram was performed; however, a recent echocardiogram  demonstrated an EF of 65 %.  CORONARY VESSELS: The coronary circulation is right dominant.  LM:   --  LM: Normal.  LAD:   --  LAD: Normal.  CX:   --  Circumflex: Normal.  RCA:   --  RCA: Normal.  COMPLICATIONS: No complications occurred during the cath lab visit.  DIAGNOSTIC IMPRESSIONS: Angigraphially normal coronaries.  DIAGNOSTIC RECOMMENDATIONS: continue primary prevention of CAD  follow up with Dr. Garcia  Prepared and signed by  Morales Douglas MD  Signed 08/31/2018 17:17:00    < end of copied text >      ELECTROPHYSIOLOGY: n/a    PAST MEDICAL HISTORY    Hypothyroidism    Smoker    Valvular heart disease        PAST SURGICAL HISTORY    History of appendectomy        SOCIAL HISTORY:  lives with   CIGARETTES:   vapes daily, quit smoking cigarettes 5 years ago  ALCOHOL: on occasion  DRUGS: denies    FAMILY HISTORY: pt was adopted    Family History of Cardiovascular Disease:  Yes [  ] No [  ]  Coronary Artery Disease in first degree relative: Yes [  ] No [  ]  Sudden Cardiac Death in First degree relative: Yes [  ] No [  ]    HOME MEDICATIONS:  levothyroxine 75 mcg (0.075 mg) oral tablet: 1 tab(s) orally once a day (22 Dec 2018 09:33)  Plavix 75 mg  Lipitor 40 mg  Losartan 100 mg  Amlodipine 10 mg  Meloxicam prn      ALLERGIES:   doxycycline (Angioedema; Rash; Hives)  aspirin (Unknown)  codeine (Unknown)      REVIEW OF SYMPTOMS:   CONSTITUTIONAL: No fever, no chills, no weight loss, no weight gain, no fatigue   ENMT:  No sinus or throat pain  NECK: No pain or stiffness  CARDIOVASCULAR: + chest pain, no dyspnea, no syncope/presyncope, + palpitations, + dizziness, no Orthopnea, no Paroxsymal nocturnal dyspnea  RESPIRATORY: no Shortness of breath, + cough, no wheezing  : No dysuria, no hematuria   GI: No dark color stool, no nausea, no diarrhea, no constipation, no abdominal pain   NEURO: No headache, no slurred speech   MUSCULOSKELETAL: No joint pain or swelling; No muscle,+ back pain, or extremity pain  PSYCH: No agitation, no anxiety.    ALL OTHER REVIEW OF SYSTEMS ARE NEGATIVE.    VITAL SIGNS:  T(C): 36.4 (03-24-24 @ 22:46), Max: 36.4 (03-24-24 @ 22:46)  T(F): 97.6 (03-24-24 @ 22:46), Max: 97.6 (03-24-24 @ 22:46)  HR: 92 (03-24-24 @ 22:46) (92 - 92)  BP: 172/85 (03-24-24 @ 22:46) (172/85 - 172/85)  RR: 18 (03-24-24 @ 22:46) (18 - 18)  SpO2: 98% (03-24-24 @ 22:46) (98% - 98%)    INTAKE AND OUTPUT:       PHYSICAL EXAM:  Constitutional: Comfortable . No acute distress.   HEENT: Atraumatic and normocephalic , neck is supple . no JVD. No carotid bruit.  CNS: A&Ox3. No focal deficits.   Respiratory: CTAB, unlabored   Cardiovascular: RRR normal s1 s2. soft diastolic murmur  Gastrointestinal: Soft, non-tender. +Bowel sounds.   Extremities: 2+ Peripheral Pulses, No clubbing, cyanosis, or edema  Psychiatric: Calm . no agitation.   Skin: Warm and dry, no ulcers on extremities     LABS:                            13.5   10.36 )-----------( 207      ( 25 Mar 2024 00:36 )             39.4     03-25    139  |  103  |  24.1<H>  ----------------------------<  130<H>  4.6   |  25.0  |  0.67    Ca    9.0      25 Mar 2024 00:36    TPro  6.6  /  Alb  4.0  /  TBili  <0.2<L>  /  DBili  x   /  AST  16  /  ALT  17  /  AlkPhos  56  03-25    PT/INR - ( 25 Mar 2024 00:36 )   PT: 9.9 sec;   INR: 0.89 ratio         PTT - ( 25 Mar 2024 00:36 )  PTT:28.9 sec  Urinalysis Basic - ( 25 Mar 2024 00:36 )    Color: x / Appearance: x / SG: x / pH: x  Gluc: 130 mg/dL / Ketone: x  / Bili: x / Urobili: x   Blood: x / Protein: x / Nitrite: x   Leuk Esterase: x / RBC: x / WBC x   Sq Epi: x / Non Sq Epi: x / Bacteria: x        INTERPRETATION OF TELEMETRY: SR 70's    ECG: SR 81 bpm, LVH, no acute ST/T changes  Prior ECG: Yes [ x ] No [  ]    RADIOLOGY & ADDITIONAL STUDIES: n/a   X-ray:    CT scan:   MRI:   US:

## 2024-03-25 NOTE — CONSULT NOTE ADULT - NS ATTEND AMEND GEN_ALL_CORE FT
chest pain  jan 2023: cta cardiac done.  minimal plaqu without any stenosis.    not showing results in [place]. perhaps was done as outpiatnet in a different [facility] imaging center.     shmuel planned for stress test . and it is performed.  Will review the results.  low risk patient.    likely symptoms due to prinzmetal   inoca vs non cardiac   if negative then discharge .    No further in-patient cardiac work-up/management is needed.  Follow-up in cardiology office in 2 weeks. chest pain  jan 2023: cta cardiac done.  minimal plaqu without any stenosis.    not showing results in [place]. perhaps was done as outpiatnet in a different [facility] imaging center.     shmuel planned for stress test . and it is performed.  Will review the results.  low risk patient.    likely symptoms due to prinzmetal   inoca vs non cardiac already on CCBs 10 of norvasc. reduce vaping.   Also PPI for GERD  if negative then discharge .    No further in-patient cardiac work-up/management is needed.  Follow-up in cardiology office in 2 weeks.

## 2024-03-25 NOTE — ED PROVIDER NOTE - ATTENDING CONTRIBUTION TO CARE
I, Dulce Maria Dooley, personally saw the patient with the resident, and completed the key components of the history and physical exam. I then discussed the management plan with the resident.

## 2024-03-25 NOTE — ED ADULT NURSE NOTE - DRUG PRE-SCREENING (DAST -1)
Clinic hours for Dr. Lemos:  Monday 8am - 5pm  Tuesday 9am - 5pm  Wednesday 8am - 12pm  Thursday 9am - 5pm   Friday 8am - 4pm    If you need a refill on your prescription, please call your pharmacy and let them know. Please be proactive and call before your medication runs out. The pharmacy will then contact us for the refill. Please allow 24-48 hours for the refill to be processed.     If your physician has ordered additional laboratory or radiology testing as part of your ongoing plan of care, please allow 5-7 business days from the day of your lab draw or test for the results to be sent and reviewed by your provider. If your results are critical and require more immediate intervention, you will be contacted sooner. Your results will be conveyed to you via a phone call or letter.    You may be receiving a patient satisfaction survey in the mail or in your email.  If you receive an email survey, please look for the subject line of:   \" Your provider name\" would like your feedback\".   Please take the time to complete your survey either via the mail or email, as your feedback is very important to us.  We strive to make your experience exceptional and your comments help us with that goal.  We look forward to hearing from you.            Statement Selected

## 2024-03-26 LAB
CULTURE RESULTS: NO GROWTH — SIGNIFICANT CHANGE UP
SPECIMEN SOURCE: SIGNIFICANT CHANGE UP

## 2024-09-03 ENCOUNTER — APPOINTMENT (OUTPATIENT)
Dept: GASTROENTEROLOGY | Facility: CLINIC | Age: 59
End: 2024-09-03
Payer: COMMERCIAL

## 2024-09-03 VITALS
HEIGHT: 59 IN | SYSTOLIC BLOOD PRESSURE: 151 MMHG | HEART RATE: 71 BPM | RESPIRATION RATE: 14 BRPM | DIASTOLIC BLOOD PRESSURE: 75 MMHG | WEIGHT: 108 LBS | OXYGEN SATURATION: 98 % | BODY MASS INDEX: 21.77 KG/M2

## 2024-09-03 DIAGNOSIS — Z86.010 PERSONAL HISTORY OF COLONIC POLYPS: ICD-10-CM

## 2024-09-03 DIAGNOSIS — Z71.9 COUNSELING, UNSPECIFIED: ICD-10-CM

## 2024-09-03 DIAGNOSIS — K21.9 GASTRO-ESOPHAGEAL REFLUX DISEASE W/OUT ESOPHAGITIS: ICD-10-CM

## 2024-09-03 DIAGNOSIS — K62.5 HEMORRHAGE OF ANUS AND RECTUM: ICD-10-CM

## 2024-09-03 DIAGNOSIS — R10.13 EPIGASTRIC PAIN: ICD-10-CM

## 2024-09-03 PROCEDURE — 99214 OFFICE O/P EST MOD 30 MIN: CPT

## 2024-09-03 RX ORDER — HYDROCORTISONE ACETATE 25 MG/1
25 SUPPOSITORY RECTAL
Qty: 12 | Refills: 5 | Status: ACTIVE | COMMUNITY
Start: 2024-09-03 | End: 1900-01-01

## 2024-09-03 NOTE — ASSESSMENT
[FreeTextEntry1] : Impression: Worsening of chronic epigastric pain GERD rule out ulcer disease or gastritis and/or esophagitis and/or H. pylori infection.  Painless hematochezia likely hemorrhoidal in etiology  Recommendations: Hydrocortisone milligram suppositories 1 per rectum nightly as needed for the above rectal bleeding.  Repeat upper endoscopy was advised for further evaluation for epigastric pain and GERD.  Preprocedure cardiac clearance will be requested.  Her ASA classification is 2.  Plavix will need to be held 5 days prior to the above procedure.  Pending cardiac clearance she will be medically optimized for the proposed upper endoscopy.

## 2024-09-03 NOTE — PHYSICAL EXAM
[Alert] : alert [Normal Voice/Communication] : normal voice/communication [Healthy Appearing] : healthy appearing [No Acute Distress] : no acute distress [Well Developed] : well developed [Well Nourished] : well nourished [Sclera] : the sclera and conjunctiva were normal [Hearing Threshold Finger Rub Not Summers] : hearing was normal [Normal Lips/Gums] : the lips and gums were normal [Oropharynx] : the oropharynx was normal [Normal Appearance] : the appearance of the neck was normal [No Neck Mass] : no neck mass was observed [No Respiratory Distress] : no respiratory distress [No Acc Muscle Use] : no accessory muscle use [Respiration, Rhythm And Depth] : normal respiratory rhythm and effort [Auscultation Breath Sounds / Voice Sounds] : lungs were clear to auscultation bilaterally [Heart Rate And Rhythm] : heart rate was normal and rhythm regular [Normal S1, S2] : normal S1 and S2 [Murmurs] : no murmurs [None] : no edema [Bowel Sounds] : normal bowel sounds [Abdomen Tenderness] : non-tender [No Masses] : no abdominal mass palpated [Abdomen Soft] : soft [] : no hepatosplenomegaly [Oriented To Time, Place, And Person] : oriented to person, place, and time

## 2024-09-03 NOTE — REASON FOR VISIT
[Follow-up] : a follow-up of an existing diagnosis [FreeTextEntry1] : For painless hematochezia and worsening GERD

## 2024-09-03 NOTE — HISTORY OF PRESENT ILLNESS
[de-identified] : 11/4/2022.  Erosive gastritis with negative esophageal biopsies for eosinophilic esophagitis or microscopic esophagitis.  Gastric biopsies negative for H. pylori. [FreeTextEntry1] : 11/4/2022.  Melanosis coli and internal hemorrhoids.

## 2024-09-03 NOTE — REVIEW OF SYSTEMS
[As Noted in HPI] : as noted in HPI [Abdominal Pain] : abdominal pain [Vomiting] : no vomiting [Constipation] : no constipation [Diarrhea] : no diarrhea [Heartburn] : heartburn [Melena (black stool)] : no melena [Bleeding] : bleeding [Fecal Incontinence (soiling)] : no fecal incontinence [Bloating (gassiness)] : no bloating [Swollen Glands] : no swollen glands [Negative] : Heme/Lymph

## 2025-02-10 NOTE — ED ADULT NURSE NOTE - CHPI ED NUR TIMING2
Refill Decision Note   Rosalva Stauffer  is requesting a refill authorization.  Brief Assessment and Rationale for Refill:  Approve     Medication Therapy Plan:         Comments:     Note composed:4:52 AM 02/10/2025             sudden onset

## 2025-06-09 NOTE — ED ADULT TRIAGE NOTE - PAIN: PRESENCE, MLM
Spray Paint Text: The liquid nitrogen was applied to the skin utilizing a spray paint frosting technique. complains of pain/discomfort

## (undated) DEVICE — STERIS DEFENDO 3-PIECE KIT (AIR/WATER, SUCTION & BIOPSY VALVES)

## (undated) DEVICE — FORCEP ENDOJAW AGTR LC W NDL 2.8MM 230CM DISP